# Patient Record
Sex: FEMALE | Race: WHITE
[De-identification: names, ages, dates, MRNs, and addresses within clinical notes are randomized per-mention and may not be internally consistent; named-entity substitution may affect disease eponyms.]

---

## 2018-11-12 ENCOUNTER — HOSPITAL ENCOUNTER (OUTPATIENT)
Dept: HOSPITAL 100 - PT | Age: 78
Discharge: HOME | End: 2018-11-12
Payer: MEDICARE

## 2018-11-12 DIAGNOSIS — M21.371: ICD-10-CM

## 2018-11-12 DIAGNOSIS — M54.40: Primary | ICD-10-CM

## 2018-11-12 PROCEDURE — 97162 PT EVAL MOD COMPLEX 30 MIN: CPT

## 2019-03-19 ENCOUNTER — HOSPITAL ENCOUNTER (OUTPATIENT)
Age: 79
End: 2019-03-19
Payer: MEDICARE

## 2019-03-19 VITALS — BODY MASS INDEX: 21 KG/M2

## 2019-03-19 DIAGNOSIS — R06.02: Primary | ICD-10-CM

## 2019-03-19 LAB
ANION GAP: 3 (ref 5–15)
BNP,B-TYPE NATRIURETIC PEPTIDE: 54.6 PG/ML (ref 0–100)
BUN SERPL-MCNC: 13 MG/DL (ref 7–18)
BUN/CREAT RATIO: 21 RATIO (ref 10–20)
CALCIUM SERPL-MCNC: 8.9 MG/DL (ref 8.5–10.1)
CARBON DIOXIDE: 32 MMOL/L (ref 21–32)
CHLORIDE: 96 MMOL/L (ref 98–107)
EST GLOM FILT RATE - AFR AMER: 120 ML/MIN (ref 60–?)
GLUCOSE: 103 MG/DL (ref 74–106)
POTASSIUM: 3.9 MMOL/L (ref 3.5–5.1)

## 2019-03-19 PROCEDURE — 36415 COLL VENOUS BLD VENIPUNCTURE: CPT

## 2019-03-19 PROCEDURE — 71046 X-RAY EXAM CHEST 2 VIEWS: CPT

## 2019-03-19 PROCEDURE — 83880 ASSAY OF NATRIURETIC PEPTIDE: CPT

## 2019-03-19 PROCEDURE — 80048 BASIC METABOLIC PNL TOTAL CA: CPT

## 2019-04-03 ENCOUNTER — HOSPITAL ENCOUNTER (OUTPATIENT)
Age: 79
End: 2019-04-03
Payer: MEDICARE

## 2019-04-03 VITALS — BODY MASS INDEX: 21 KG/M2

## 2019-04-03 DIAGNOSIS — I34.0: Primary | ICD-10-CM

## 2019-04-03 PROCEDURE — 93306 TTE W/DOPPLER COMPLETE: CPT

## 2019-05-30 ENCOUNTER — HOSPITAL ENCOUNTER (OUTPATIENT)
Age: 79
End: 2019-05-30
Payer: MEDICARE

## 2019-05-30 VITALS — BODY MASS INDEX: 22.3 KG/M2

## 2019-05-30 DIAGNOSIS — R06.02: Primary | ICD-10-CM

## 2019-05-30 PROCEDURE — 94729 DIFFUSING CAPACITY: CPT

## 2019-05-30 PROCEDURE — 94060 EVALUATION OF WHEEZING: CPT

## 2019-05-30 PROCEDURE — 94726 PLETHYSMOGRAPHY LUNG VOLUMES: CPT

## 2019-06-11 ENCOUNTER — HOSPITAL ENCOUNTER (OUTPATIENT)
Age: 79
End: 2019-06-11
Payer: MEDICARE

## 2019-06-11 VITALS — HEART RATE: 68 BPM | OXYGEN SATURATION: 86 %

## 2019-06-11 VITALS — BODY MASS INDEX: 22.3 KG/M2

## 2019-06-11 DIAGNOSIS — R06.02: Primary | ICD-10-CM

## 2019-06-11 PROCEDURE — 94618 PULMONARY STRESS TESTING: CPT

## 2019-11-06 ENCOUNTER — HOSPITAL ENCOUNTER (OUTPATIENT)
Age: 79
End: 2019-11-06
Payer: MEDICARE

## 2019-11-06 VITALS — BODY MASS INDEX: 23 KG/M2

## 2019-11-06 DIAGNOSIS — R06.00: Primary | ICD-10-CM

## 2019-11-06 DIAGNOSIS — R53.83: ICD-10-CM

## 2019-11-06 PROCEDURE — 93226 XTRNL ECG REC<48 HR SCAN A/R: CPT

## 2019-11-06 PROCEDURE — 93225 XTRNL ECG REC<48 HRS REC: CPT

## 2019-11-19 ENCOUNTER — HOSPITAL ENCOUNTER (OUTPATIENT)
Age: 79
End: 2019-11-19
Payer: MEDICARE

## 2019-11-19 VITALS — BODY MASS INDEX: 23 KG/M2 | BODY MASS INDEX: 22.5 KG/M2

## 2019-11-19 DIAGNOSIS — M41.9: ICD-10-CM

## 2019-11-19 DIAGNOSIS — R09.02: Primary | ICD-10-CM

## 2019-11-19 PROCEDURE — 94729 DIFFUSING CAPACITY: CPT

## 2019-11-19 PROCEDURE — 94060 EVALUATION OF WHEEZING: CPT

## 2019-11-19 PROCEDURE — 94726 PLETHYSMOGRAPHY LUNG VOLUMES: CPT

## 2020-02-05 ENCOUNTER — OFFICE VISIT (OUTPATIENT)
Dept: FAMILY MEDICINE CLINIC | Age: 80
End: 2020-02-05
Payer: MEDICARE

## 2020-02-05 VITALS
DIASTOLIC BLOOD PRESSURE: 60 MMHG | BODY MASS INDEX: 22.89 KG/M2 | SYSTOLIC BLOOD PRESSURE: 98 MMHG | WEIGHT: 124.4 LBS | HEIGHT: 62 IN | OXYGEN SATURATION: 92 % | RESPIRATION RATE: 16 BRPM | HEART RATE: 59 BPM

## 2020-02-05 PROBLEM — Z76.89 ENCOUNTER TO ESTABLISH CARE: Status: ACTIVE | Noted: 2020-02-05

## 2020-02-05 PROBLEM — L60.0 INGROWN TOENAIL OF LEFT FOOT: Status: ACTIVE | Noted: 2020-02-05

## 2020-02-05 PROBLEM — Z87.09 H/O CHRONIC RESPIRATORY FAILURE: Status: ACTIVE | Noted: 2020-01-21

## 2020-02-05 PROBLEM — M25.511 ACUTE PAIN OF RIGHT SHOULDER: Status: ACTIVE | Noted: 2020-02-05

## 2020-02-05 PROBLEM — F41.9 ANXIETY: Status: ACTIVE | Noted: 2020-02-05

## 2020-02-05 PROBLEM — Z79.899 HIGH RISK MEDICATION USE: Status: ACTIVE | Noted: 2020-01-21

## 2020-02-05 PROBLEM — G62.9 PERIPHERAL NEUROPATHY: Status: ACTIVE | Noted: 2020-01-21

## 2020-02-05 PROCEDURE — G8427 DOCREV CUR MEDS BY ELIG CLIN: HCPCS | Performed by: NURSE PRACTITIONER

## 2020-02-05 PROCEDURE — G8484 FLU IMMUNIZE NO ADMIN: HCPCS | Performed by: NURSE PRACTITIONER

## 2020-02-05 PROCEDURE — 99203 OFFICE O/P NEW LOW 30 MIN: CPT | Performed by: NURSE PRACTITIONER

## 2020-02-05 PROCEDURE — G8420 CALC BMI NORM PARAMETERS: HCPCS | Performed by: NURSE PRACTITIONER

## 2020-02-05 PROCEDURE — 1123F ACP DISCUSS/DSCN MKR DOCD: CPT | Performed by: NURSE PRACTITIONER

## 2020-02-05 PROCEDURE — 1036F TOBACCO NON-USER: CPT | Performed by: NURSE PRACTITIONER

## 2020-02-05 PROCEDURE — 4040F PNEUMOC VAC/ADMIN/RCVD: CPT | Performed by: NURSE PRACTITIONER

## 2020-02-05 PROCEDURE — G8400 PT W/DXA NO RESULTS DOC: HCPCS | Performed by: NURSE PRACTITIONER

## 2020-02-05 PROCEDURE — 1090F PRES/ABSN URINE INCON ASSESS: CPT | Performed by: NURSE PRACTITIONER

## 2020-02-05 RX ORDER — LORATADINE 10 MG/1
10 TABLET ORAL DAILY PRN
COMMUNITY
End: 2020-06-19 | Stop reason: ALTCHOICE

## 2020-02-05 RX ORDER — B-COMPLEX WITH VITAMIN C
1 TABLET ORAL 3 TIMES DAILY
COMMUNITY

## 2020-02-05 RX ORDER — GABAPENTIN 100 MG/1
100 CAPSULE ORAL DAILY PRN
COMMUNITY

## 2020-02-05 RX ORDER — VITAMIN E (DL,TOCOPHERYL ACET) 180 MG
500 CAPSULE ORAL DAILY
COMMUNITY

## 2020-02-05 RX ORDER — FUROSEMIDE 40 MG/1
20 TABLET ORAL DAILY
COMMUNITY
Start: 2020-01-21 | End: 2020-08-06 | Stop reason: DRUGHIGH

## 2020-02-05 RX ORDER — LORAZEPAM 0.5 MG/1
0.5 TABLET ORAL 2 TIMES DAILY PRN
Qty: 30 TABLET | Refills: 2 | Status: SHIPPED | OUTPATIENT
Start: 2020-02-05 | End: 2020-06-01 | Stop reason: SDUPTHER

## 2020-02-05 RX ORDER — LANSOPRAZOLE 15 MG/1
CAPSULE, DELAYED RELEASE ORAL
COMMUNITY
Start: 2017-09-21

## 2020-02-05 RX ORDER — LISINOPRIL 40 MG/1
40 TABLET ORAL DAILY
COMMUNITY
Start: 2019-12-04

## 2020-02-05 RX ORDER — LORAZEPAM 0.5 MG/1
0.5 TABLET ORAL DAILY PRN
COMMUNITY
End: 2020-02-05 | Stop reason: SDUPTHER

## 2020-02-05 RX ORDER — SOTALOL HYDROCHLORIDE 80 MG/1
80 TABLET ORAL 2 TIMES DAILY
COMMUNITY
Start: 2020-01-21 | End: 2021-04-01 | Stop reason: SDUPTHER

## 2020-02-05 RX ORDER — TIZANIDINE 2 MG/1
2 TABLET ORAL PRN
COMMUNITY
End: 2020-12-18 | Stop reason: ALTCHOICE

## 2020-02-05 RX ORDER — NIFEDIPINE 30 MG/1
30 TABLET, EXTENDED RELEASE ORAL DAILY
COMMUNITY
Start: 2020-01-21 | End: 2022-01-03 | Stop reason: SINTOL

## 2020-02-05 RX ORDER — DOXAZOSIN MESYLATE 4 MG/1
4 TABLET ORAL DAILY
COMMUNITY
Start: 2019-12-28 | End: 2020-12-02

## 2020-02-05 RX ORDER — ALBUTEROL SULFATE 90 UG/1
1 AEROSOL, METERED RESPIRATORY (INHALATION) EVERY 6 HOURS PRN
COMMUNITY
End: 2020-07-31 | Stop reason: SDUPTHER

## 2020-02-05 RX ORDER — ACETAMINOPHEN 500 MG
TABLET ORAL
COMMUNITY
Start: 2011-05-11

## 2020-02-05 RX ORDER — PRAMIPEXOLE DIHYDROCHLORIDE 0.5 MG/1
1 TABLET ORAL 3 TIMES DAILY
COMMUNITY
End: 2020-07-22 | Stop reason: SDUPTHER

## 2020-02-05 ASSESSMENT — PATIENT HEALTH QUESTIONNAIRE - PHQ9
1. LITTLE INTEREST OR PLEASURE IN DOING THINGS: 0
SUM OF ALL RESPONSES TO PHQ QUESTIONS 1-9: 0
SUM OF ALL RESPONSES TO PHQ QUESTIONS 1-9: 0
SUM OF ALL RESPONSES TO PHQ9 QUESTIONS 1 & 2: 0
2. FEELING DOWN, DEPRESSED OR HOPELESS: 0

## 2020-02-05 ASSESSMENT — ENCOUNTER SYMPTOMS
GASTROINTESTINAL NEGATIVE: 1
RESPIRATORY NEGATIVE: 1
BACK PAIN: 1

## 2020-02-05 NOTE — PROGRESS NOTES
Subjective:      Chief Complaint   Patient presents with    New Patient     67year old female in office to establish care    Toe Pain     Pt states left big toe was inflamed the other day       HPI:  Marzena Medina is a 67 y.o. female who presents today to establish care as a new patient. She recently moved from [place] to be closer to family. She currently follows with Dr. Sofia Bui, Cardiology. She was last seen on 01/21/2020, blood work was ordered at that time. She also follows with Dr. Jayme Portillo, Pulmonologist for restrictive lung disease. She is on chronic home O2, 2 liters nasal cannula. Her next appointment with Pulmonology is in March. She has severe scoliosis and LE neuropathy which has increased her risk of falls. She currently lives alone and uses a 4 wheeled walker to assist with ambulation at all times. When she lived in [place] she was seeing a Neurologist who recommended a brain MRI to further evaluate worsening neuropathy and falls. She declined to have further testing and is not interested in seeing another neurologist at this time. She is taking Gabapentin as needed when neuropathy pain is severe. She does not take it daily because it makes her feel drowsy and she doesn't want to increase her chances of falling. Her last fall was in September 2019. She does not drive and relies on family members for transportation. She has generalized anxiety and has taken Ativan for years. Per [place] it has been prescribed by her previous PCP and was last filled 11/19/2019. She does not take it everyday only when her anxiety is severe. She would like to have prescription refilled today    Has right shoulder pain with decreased ROM. She feels that the pain is a result of her last fall in September. She does feel that her right arm is weaker than her left. She has not had imaging. She is hesitant for physical therapy because leaving the home can be cumbersome.      She has a 2 day history of pain around the nail bed of her left great toe. She notes increased redness with mild swelling. She denies drainage or any lesions. Past Medical History:   Diagnosis Date    H/O chronic respiratory failure 1/21/2020    High risk medication use 1/21/2020    HTN (hypertension)     Mitral valve prolapse     Paroxysmal atrial fibrillation (HCC)     Peripheral neuropathy 1/21/2020    Restrictive lung disease     chronic home O2 - 2 liters [place]    Scoliosis         Social History     Tobacco Use    Smoking status: Never Smoker    Smokeless tobacco: Never Used   Substance Use Topics    Alcohol use: Yes     Comment: occaisonal wine        Review of Systems   Constitutional: Negative. HENT: Negative. Respiratory: Negative. Cardiovascular: Negative. Gastrointestinal: Negative. Endocrine: Negative. Musculoskeletal: Positive for arthralgias, back pain, gait problem and myalgias. Skin: Negative. Neurological: Positive for weakness. Negative for dizziness, tremors, seizures, syncope, facial asymmetry, speech difficulty, light-headedness, numbness and headaches. Psychiatric/Behavioral: Negative for decreased concentration, dysphoric mood, sleep disturbance and suicidal ideas. The patient is nervous/anxious. The patient is not hyperactive. Objective:      BP 98/60 (Site: Right Upper Arm, Position: Sitting, Cuff Size: Medium Adult)   Pulse 59   Resp 16   Ht 5' 2\" (1.575 m)   Wt 124 lb 6.4 oz (56.4 kg)   SpO2 92%   BMI 22.75 kg/m²      Physical Exam  Vitals signs reviewed. HENT:      Mouth/Throat:      Mouth: Mucous membranes are moist.      Pharynx: Oropharynx is clear. Eyes:      Extraocular Movements: Extraocular movements intact. Conjunctiva/sclera: Conjunctivae normal.      Pupils: Pupils are equal, round, and reactive to light. Neck:      Musculoskeletal: Normal range of motion and neck supple. Thyroid: No thyroid mass, thyromegaly or thyroid tenderness. Vascular: No carotid bruit. Cardiovascular:      Rate and Rhythm: Regular rhythm. Bradycardia present. Pulses: Normal pulses. Heart sounds: Normal heart sounds. Pulmonary:      Effort: Pulmonary effort is normal.      Breath sounds: Decreased breath sounds (throughout) present. No wheezing, rhonchi or rales. Abdominal:      General: Bowel sounds are normal.      Palpations: Abdomen is soft. There is no mass. Hernia: No hernia is present. Feet:      Right foot:      Skin integrity: Skin integrity normal.      Left foot:      Skin integrity: Skin integrity normal.      Toenail Condition: Left toenails are ingrown. Skin:     General: Skin is warm and dry. Capillary Refill: Capillary refill takes less than 2 seconds. Neurological:      General: No focal deficit present. Mental Status: She is alert. Cranial Nerves: Cranial nerves are intact. Sensory: Sensory deficit (bilateral LE with decreased sensation) present. Motor: Weakness (RUE and bilateral LE) present. Gait: Gait abnormal (she is using a walker). Psychiatric:         Attention and Perception: Attention normal.         Mood and Affect: Mood and affect normal.         Behavior: Behavior normal. Behavior is cooperative. Cognition and Memory: Cognition and memory normal.            Assessment / Plan:      1. Encounter to establish care  Welcome to my practice, I look forward to helping you meet your health goals. Please let me know how I can help you. 2. Ingrown toenail of left foot  Soak the affected foot in warm, soapy water for 10 to 20 minutes three times per day for one to two weeks, pushing the lateral nail fold away from the nail plate. Alternatively, a solution of water mixed with 1 to 2 teaspoons of Epsom salts can be used   - triamcinolone (KENALOG) 0.1 % ointment; Apply topically 2 times daily for 14 days  Dispense: 1 Tube; Refill: 1    3.  Anxiety  OARRS reviewed - no concerning behaviors identified  - LORazepam (ATIVAN) 0.5 MG tablet; Take 1 tablet by mouth 2 times daily as needed for Anxiety for up to 45 days. Dispense: 30 tablet; Refill: 2    4. At high risk for falls  [facility]  - [facility]    5. Acute pain of right shoulder  - Emmett [address], APRN - CNP    On the basis of positive falls risk screening, assessment and plan is as follows: home safety tips provided, referral to physical therapy provided for strength and balance training.

## 2020-02-05 NOTE — PATIENT INSTRUCTIONS
Patient Education        Ingrown Toenail: Care Instructions  Your Care Instructions    An ingrown toenail often occurs because a nail is not trimmed correctly or because shoes are too tight. An ingrown nail can cause an infection. If your toe is infected, your doctor may prescribe antibiotics. Most ingrown toenails can be treated at home. You should trim toenails straight across, so the ends of the nail grow over the skin and not into it. Good nail care can prevent ingrown toenails. Follow-up care is a key part of your treatment and safety. Be sure to make and go to all appointments, and call your doctor if you are having problems. It's also a good idea to know your test results and keep a list of the medicines you take. How can you care for yourself at home? · Trim the nails straight across. Leave the corners a little longer so they do not cut into the skin. To do this when you have an ingrown nail:  ? Soak your foot in warm water for about 15 minutes to soften the nail. ? Wedge a small piece of wet cotton under the corner of the nail to cushion the nail and lift it slightly. This keeps it from cutting the skin. ? Repeat daily until the nail has grown out and can be trimmed. · Do not use manicure scissors to dig under the ingrown nail. You might stab your toe, which could get infected. · Do not trim your toenails too short. · Check with your doctor before trimming your own toenails if you have been diagnosed with diabetes or peripheral arterial disease. These conditions increase the risk of an infection, because you may have decreased sensation in your toes and cut yourself without knowing it. · Wear roomy, comfortable shoes. · If your doctor prescribed antibiotics, take them as directed. Do not stop taking them just because you feel better. You need to take the full course of antibiotics. When should you call for help?   Call your doctor now or seek immediate medical care if:    · You have signs of infection, such as:  ? Increased pain, swelling, warmth, or redness. ? Red streaks leading from the toe. ? Pus draining from the toe. ? A fever.    Watch closely for changes in your health, and be sure to contact your doctor if:    · You do not get better as expected. Where can you learn more? Go to [email or web address]://[email or web address] org and sign in to your [employer] account. Enter R135 in the [employer] box to learn more about \"Ingrown Toenail: Care Instructions. \"     If you do not have an account, please click on the \"Sign Up Now\" link. Current as of: April 1, 2019  Content Version: 12.3  © 7673-6697 Healthwise, Incorporated. Care instructions adapted under license by [facility] ([facility]). If you have questions about a medical condition or this instruction, always ask your healthcare professional. [address] any warranty or liability for your use of this information.

## 2020-05-15 ENCOUNTER — TELEPHONE (OUTPATIENT)
Dept: FAMILY MEDICINE CLINIC | Age: 80
End: 2020-05-15

## 2020-05-15 NOTE — TELEPHONE ENCOUNTER
I sent in a refill for Ativan on 02/05 when I saw her in office. She should check with the pharmacy to see if they have the prescription. If not I will resend.

## 2020-06-01 RX ORDER — LORAZEPAM 0.5 MG/1
0.5 TABLET ORAL 2 TIMES DAILY PRN
Qty: 30 TABLET | Refills: 2 | Status: SHIPPED | OUTPATIENT
Start: 2020-06-01 | End: 2020-07-16

## 2020-06-19 ENCOUNTER — OFFICE VISIT (OUTPATIENT)
Dept: FAMILY MEDICINE CLINIC | Age: 80
End: 2020-06-19
Payer: MEDICARE

## 2020-06-19 VITALS
SYSTOLIC BLOOD PRESSURE: 110 MMHG | TEMPERATURE: 97.3 F | HEART RATE: 65 BPM | RESPIRATION RATE: 14 BRPM | OXYGEN SATURATION: 93 % | WEIGHT: 125.2 LBS | DIASTOLIC BLOOD PRESSURE: 60 MMHG | BODY MASS INDEX: 22.9 KG/M2

## 2020-06-19 PROCEDURE — 69209 REMOVE IMPACTED EAR WAX UNI: CPT | Performed by: NURSE PRACTITIONER

## 2020-06-19 PROCEDURE — 4040F PNEUMOC VAC/ADMIN/RCVD: CPT | Performed by: NURSE PRACTITIONER

## 2020-06-19 PROCEDURE — 93000 ELECTROCARDIOGRAM COMPLETE: CPT | Performed by: NURSE PRACTITIONER

## 2020-06-19 PROCEDURE — G8427 DOCREV CUR MEDS BY ELIG CLIN: HCPCS | Performed by: NURSE PRACTITIONER

## 2020-06-19 PROCEDURE — G8400 PT W/DXA NO RESULTS DOC: HCPCS | Performed by: NURSE PRACTITIONER

## 2020-06-19 PROCEDURE — G8420 CALC BMI NORM PARAMETERS: HCPCS | Performed by: NURSE PRACTITIONER

## 2020-06-19 PROCEDURE — 1090F PRES/ABSN URINE INCON ASSESS: CPT | Performed by: NURSE PRACTITIONER

## 2020-06-19 PROCEDURE — 99214 OFFICE O/P EST MOD 30 MIN: CPT | Performed by: NURSE PRACTITIONER

## 2020-06-19 PROCEDURE — 1036F TOBACCO NON-USER: CPT | Performed by: NURSE PRACTITIONER

## 2020-06-19 PROCEDURE — 1123F ACP DISCUSS/DSCN MKR DOCD: CPT | Performed by: NURSE PRACTITIONER

## 2020-06-19 RX ORDER — FLUTICASONE PROPIONATE 50 MCG
1 SPRAY, SUSPENSION (ML) NASAL DAILY
Qty: 2 BOTTLE | Refills: 5 | Status: SHIPPED | OUTPATIENT
Start: 2020-06-19 | End: 2022-01-03

## 2020-06-19 ASSESSMENT — PATIENT HEALTH QUESTIONNAIRE - PHQ9
1. LITTLE INTEREST OR PLEASURE IN DOING THINGS: 1
SUM OF ALL RESPONSES TO PHQ9 QUESTIONS 1 & 2: 1
2. FEELING DOWN, DEPRESSED OR HOPELESS: 0
SUM OF ALL RESPONSES TO PHQ QUESTIONS 1-9: 1
SUM OF ALL RESPONSES TO PHQ QUESTIONS 1-9: 1

## 2020-06-19 ASSESSMENT — ENCOUNTER SYMPTOMS
RESPIRATORY NEGATIVE: 1
BACK PAIN: 1
GASTROINTESTINAL NEGATIVE: 1

## 2020-06-19 NOTE — PROGRESS NOTES
flonaSubjective:      Chief Complaint   Patient presents with    Other     patient is here to discuss going to physical therapy        HPI:  Teddy Mckay is a 79 y.o. female who presents today for the following concerns:     She has severe scoliosis and LE neuropathy which has increased her risk of falls. She currently lives alone and uses a 4 wheeled walker to assist with ambulation at all times. She fell the other day - she felt like her legs were weak and gave out. She denies LOC or injury. When she lived in [place] she was seeing a Neurologist who recommended a brain MRI to further evaluate worsening neuropathy and falls. She declined to have further testing at that time. She would like a referral to local Neurologist.   She is taking Gabapentin as needed when neuropathy pain is severe. She does not take it daily because it makes her feel drowsy and she doesn't want to increase her chances of falling. She does not drive and relies on family members for transportation. Has right shoulder pain with decreased ROM. She feels that the pain is a result of her last fall in September. She does feel that her right arm is weaker than her left but has gradually improved since she was seen in February. She has not had imaging. Physical therapy was ordered in 02/2020. She has seasonal allergies and takes Claritin as needed. Even with Claritin she is sneezing, has pressure in her ears and nasal congestion. She denies fever, chills, headaches, sore throat, cough, or shortness of breath.      Past Medical History:   Diagnosis Date    Anxiety     H/O chronic respiratory failure 1/21/2020    High risk medication use 1/21/2020    HTN (hypertension)     Mitral valve prolapse     Paroxysmal atrial fibrillation (HCC)     Peripheral neuropathy 1/21/2020    Restrictive lung disease     chronic home O2 - 2 liters [place]    Scoliosis         Social History     Tobacco Use    Smoking status: Never Smoker   

## 2020-06-20 LAB
ANION GAP SERPL CALCULATED.3IONS-SCNC: 15 MMOL/L (ref 3–16)
BUN BLDV-MCNC: 16 MG/DL (ref 7–20)
CALCIUM SERPL-MCNC: 9.2 MG/DL (ref 8.3–10.6)
CHLORIDE BLD-SCNC: 85 MMOL/L (ref 99–110)
CO2: 32 MMOL/L (ref 21–32)
CREAT SERPL-MCNC: 0.6 MG/DL (ref 0.6–1.2)
GFR AFRICAN AMERICAN: >60
GFR NON-AFRICAN AMERICAN: >60
GLUCOSE BLD-MCNC: 93 MG/DL (ref 70–99)
POTASSIUM SERPL-SCNC: 4 MMOL/L (ref 3.5–5.1)
SODIUM BLD-SCNC: 132 MMOL/L (ref 136–145)
T4 FREE: 1.3 NG/DL (ref 0.9–1.8)
TSH SERPL DL<=0.05 MIU/L-ACNC: 1.36 UIU/ML (ref 0.27–4.2)

## 2020-06-22 PROBLEM — E87.1 HYPONATREMIA: Status: ACTIVE | Noted: 2020-06-22

## 2020-06-22 ASSESSMENT — ENCOUNTER SYMPTOMS
SINUS PAIN: 0
SINUS PRESSURE: 1
EYES NEGATIVE: 1
TROUBLE SWALLOWING: 0
SORE THROAT: 0

## 2020-06-23 DIAGNOSIS — I48.91 ATRIAL FIBRILLATION, UNSPECIFIED TYPE (HCC): ICD-10-CM

## 2020-06-23 DIAGNOSIS — E87.1 HYPONATREMIA: ICD-10-CM

## 2020-06-23 LAB
ANION GAP SERPL CALCULATED.3IONS-SCNC: 10 MMOL/L (ref 3–16)
BUN BLDV-MCNC: 14 MG/DL (ref 7–20)
CALCIUM SERPL-MCNC: 9.6 MG/DL (ref 8.3–10.6)
CHLORIDE BLD-SCNC: 84 MMOL/L (ref 99–110)
CO2: 36 MMOL/L (ref 21–32)
CREAT SERPL-MCNC: 0.5 MG/DL (ref 0.6–1.2)
GFR AFRICAN AMERICAN: >60
GFR NON-AFRICAN AMERICAN: >60
GLUCOSE BLD-MCNC: 123 MG/DL (ref 70–99)
MAGNESIUM: 2 MG/DL (ref 1.8–2.4)
POTASSIUM SERPL-SCNC: 3.9 MMOL/L (ref 3.5–5.1)
SODIUM BLD-SCNC: 130 MMOL/L (ref 136–145)

## 2020-07-01 ENCOUNTER — TELEPHONE (OUTPATIENT)
Dept: FAMILY MEDICINE CLINIC | Age: 80
End: 2020-07-01

## 2020-07-01 NOTE — TELEPHONE ENCOUNTER
Patients physical therapist (Belkis)  called and would like to continue physical therapy with patient for 10 session for strength and balance.  Please advise   Belkis [phone]

## 2020-07-06 ENCOUNTER — HOSPITAL ENCOUNTER (OUTPATIENT)
Age: 80
Setting detail: SPECIMEN
Discharge: HOME | End: 2020-07-06
Payer: MEDICARE

## 2020-07-06 LAB
CHLORIDE, UR: 60 MMOL/24 HR (ref 110–250)
Lab: 24 HRS
POTASSIUM 24 HOUR URINE: 34 MMOL/24 HR (ref 25–150)
SODIUM 24 HOUR URINE: 69 MMOL/24 HR (ref 40–220)
VOLUME, (UVOL): 1500 MLS

## 2020-07-06 PROCEDURE — 81050 URINALYSIS VOLUME MEASURE: CPT

## 2020-07-06 PROCEDURE — 84300 ASSAY OF URINE SODIUM: CPT

## 2020-07-06 PROCEDURE — 84133 ASSAY OF URINE POTASSIUM: CPT

## 2020-07-06 PROCEDURE — 82436 ASSAY OF URINE CHLORIDE: CPT

## 2020-07-22 RX ORDER — PRAMIPEXOLE DIHYDROCHLORIDE 0.5 MG/1
1 TABLET ORAL 3 TIMES DAILY
Qty: 180 TABLET | Refills: 2 | Status: SHIPPED | OUTPATIENT
Start: 2020-07-22 | End: 2020-07-27 | Stop reason: SDUPTHER

## 2020-07-24 ENCOUNTER — TELEPHONE (OUTPATIENT)
Dept: FAMILY MEDICINE CLINIC | Age: 80
End: 2020-07-24

## 2020-07-24 NOTE — TELEPHONE ENCOUNTER
[place] [facility] [place] a [place] stating that she went to patient's house for her normal visit and patient stated that she was having abdomen pain on the left side, which started Sunday. Pain has gotten better with time, but patient stated that it felt like it did when she had diverticulitis. Patient is bloated and is constipated. Hasn't had a BM for a few days. Please advise.  Thank you

## 2020-07-24 NOTE — TELEPHONE ENCOUNTER
Called patient and left a message for her to call me back as PCP wanted her to make an appointment today for further evaluation.

## 2020-07-27 RX ORDER — PRAMIPEXOLE DIHYDROCHLORIDE 0.5 MG/1
1 TABLET ORAL 3 TIMES DAILY
Qty: 180 TABLET | Refills: 2 | Status: SHIPPED | OUTPATIENT
Start: 2020-07-27 | End: 2020-07-31 | Stop reason: SDUPTHER

## 2020-07-31 ENCOUNTER — OFFICE VISIT (OUTPATIENT)
Dept: FAMILY MEDICINE CLINIC | Age: 80
End: 2020-07-31
Payer: MEDICARE

## 2020-07-31 VITALS
SYSTOLIC BLOOD PRESSURE: 120 MMHG | TEMPERATURE: 97.2 F | OXYGEN SATURATION: 91 % | BODY MASS INDEX: 22.31 KG/M2 | RESPIRATION RATE: 14 BRPM | HEART RATE: 62 BPM | WEIGHT: 122 LBS | DIASTOLIC BLOOD PRESSURE: 80 MMHG

## 2020-07-31 DIAGNOSIS — E87.1 HYPONATREMIA: ICD-10-CM

## 2020-07-31 PROBLEM — J98.4 RESTRICTIVE LUNG DISEASE: Status: ACTIVE | Noted: 2020-07-31

## 2020-07-31 LAB
A/G RATIO: 1.6 (ref 1.1–2.2)
ALBUMIN SERPL-MCNC: 4.2 G/DL (ref 3.4–5)
ALP BLD-CCNC: 76 U/L (ref 40–129)
ALT SERPL-CCNC: 13 U/L (ref 10–40)
ANION GAP SERPL CALCULATED.3IONS-SCNC: 14 MMOL/L (ref 3–16)
AST SERPL-CCNC: 23 U/L (ref 15–37)
BILIRUB SERPL-MCNC: <0.2 MG/DL (ref 0–1)
BILIRUBIN, POC: NEGATIVE
BLOOD URINE, POC: NEGATIVE
BUN BLDV-MCNC: 11 MG/DL (ref 7–20)
CALCIUM SERPL-MCNC: 9.8 MG/DL (ref 8.3–10.6)
CHLORIDE BLD-SCNC: 88 MMOL/L (ref 99–110)
CLARITY, POC: ABNORMAL
CO2: 32 MMOL/L (ref 21–32)
COLOR, POC: ABNORMAL
CREAT SERPL-MCNC: 0.6 MG/DL (ref 0.6–1.2)
GFR AFRICAN AMERICAN: >60
GFR NON-AFRICAN AMERICAN: >60
GLOBULIN: 2.6 G/DL
GLUCOSE BLD-MCNC: 88 MG/DL (ref 70–99)
GLUCOSE URINE, POC: NEGATIVE
KETONES, POC: NEGATIVE
LEUKOCYTE EST, POC: NEGATIVE
NITRITE, POC: NEGATIVE
PH, POC: 8.5
POTASSIUM SERPL-SCNC: 4.7 MMOL/L (ref 3.5–5.1)
PROTEIN, POC: ABNORMAL
SODIUM BLD-SCNC: 134 MMOL/L (ref 136–145)
SPECIFIC GRAVITY, POC: 1.02
TOTAL PROTEIN: 6.8 G/DL (ref 6.4–8.2)
UROBILINOGEN, POC: ABNORMAL

## 2020-07-31 PROCEDURE — 1123F ACP DISCUSS/DSCN MKR DOCD: CPT | Performed by: NURSE PRACTITIONER

## 2020-07-31 PROCEDURE — 1036F TOBACCO NON-USER: CPT | Performed by: NURSE PRACTITIONER

## 2020-07-31 PROCEDURE — 1090F PRES/ABSN URINE INCON ASSESS: CPT | Performed by: NURSE PRACTITIONER

## 2020-07-31 PROCEDURE — 99213 OFFICE O/P EST LOW 20 MIN: CPT | Performed by: NURSE PRACTITIONER

## 2020-07-31 PROCEDURE — G0444 DEPRESSION SCREEN ANNUAL: HCPCS | Performed by: NURSE PRACTITIONER

## 2020-07-31 PROCEDURE — G8400 PT W/DXA NO RESULTS DOC: HCPCS | Performed by: NURSE PRACTITIONER

## 2020-07-31 PROCEDURE — G8427 DOCREV CUR MEDS BY ELIG CLIN: HCPCS | Performed by: NURSE PRACTITIONER

## 2020-07-31 PROCEDURE — 81002 URINALYSIS NONAUTO W/O SCOPE: CPT | Performed by: NURSE PRACTITIONER

## 2020-07-31 PROCEDURE — G8420 CALC BMI NORM PARAMETERS: HCPCS | Performed by: NURSE PRACTITIONER

## 2020-07-31 PROCEDURE — 4040F PNEUMOC VAC/ADMIN/RCVD: CPT | Performed by: NURSE PRACTITIONER

## 2020-07-31 RX ORDER — ALBUTEROL SULFATE 90 UG/1
2 AEROSOL, METERED RESPIRATORY (INHALATION) 4 TIMES DAILY PRN
Qty: 3 INHALER | Refills: 1 | Status: SHIPPED | OUTPATIENT
Start: 2020-07-31 | End: 2020-07-31

## 2020-07-31 RX ORDER — PRAMIPEXOLE DIHYDROCHLORIDE 0.5 MG/1
1 TABLET ORAL 3 TIMES DAILY
Qty: 180 TABLET | Refills: 2 | Status: SHIPPED | OUTPATIENT
Start: 2020-07-31 | End: 2022-10-27

## 2020-07-31 RX ORDER — ALBUTEROL SULFATE 90 UG/1
1 AEROSOL, METERED RESPIRATORY (INHALATION) EVERY 4 HOURS PRN
Qty: 2 INHALER | Refills: 5 | Status: SHIPPED
Start: 2020-07-31 | End: 2020-07-31 | Stop reason: SDUPTHER

## 2020-07-31 RX ORDER — PRAMIPEXOLE DIHYDROCHLORIDE 0.5 MG/1
1 TABLET ORAL 3 TIMES DAILY
Qty: 180 TABLET | Refills: 2 | Status: SHIPPED | OUTPATIENT
Start: 2020-07-31 | End: 2020-07-31

## 2020-07-31 ASSESSMENT — PATIENT HEALTH QUESTIONNAIRE - PHQ9
3. TROUBLE FALLING OR STAYING ASLEEP: 0
2. FEELING DOWN, DEPRESSED OR HOPELESS: 1
10. IF YOU CHECKED OFF ANY PROBLEMS, HOW DIFFICULT HAVE THESE PROBLEMS MADE IT FOR YOU TO DO YOUR WORK, TAKE CARE OF THINGS AT HOME, OR GET ALONG WITH OTHER PEOPLE: 0
5. POOR APPETITE OR OVEREATING: 1
4. FEELING TIRED OR HAVING LITTLE ENERGY: 3
SUM OF ALL RESPONSES TO PHQ QUESTIONS 1-9: 8
7. TROUBLE CONCENTRATING ON THINGS, SUCH AS READING THE NEWSPAPER OR WATCHING TELEVISION: 0
SUM OF ALL RESPONSES TO PHQ QUESTIONS 1-9: 8
6. FEELING BAD ABOUT YOURSELF - OR THAT YOU ARE A FAILURE OR HAVE LET YOURSELF OR YOUR FAMILY DOWN: 0
9. THOUGHTS THAT YOU WOULD BE BETTER OFF DEAD, OR OF HURTING YOURSELF: 0
8. MOVING OR SPEAKING SO SLOWLY THAT OTHER PEOPLE COULD HAVE NOTICED. OR THE OPPOSITE, BEING SO FIGETY OR RESTLESS THAT YOU HAVE BEEN MOVING AROUND A LOT MORE THAN USUAL: 0
SUM OF ALL RESPONSES TO PHQ9 QUESTIONS 1 & 2: 4
1. LITTLE INTEREST OR PLEASURE IN DOING THINGS: 3

## 2020-07-31 NOTE — PROGRESS NOTES
Subjective:      Chief Complaint   Patient presents with    Abdominal Pain     patient was abdominal pain denies having nausea, vomiting she was taking a stool softner that caused loose bowel the discomfort has improved        HPI:  Nelsy Maria is a 79 y.o. female who presents today for the following concerns:     Constipation  Patient complains of constipation. Onset was several weeks ago. Defecation has been difficult. Co-Morbid conditions:irritable bowel syndrome. Symptoms have gradually improved. Current Health Habits: Eating fiber? no, Exercise? no, Adequate hydration? yes. Current over the counter/prescription laxative: She has been taking OTC Aminah Debra products to help soften stool  which has been not very effective. She thinks that the supplements are causing abdominal cramping. Shortness of Breath  The patient complains of increased RODRIGUEZ. Onset of symptoms was about 2 weeks ago after she decreased her Lasix to 20 mg daily. Symptom course has been constant. Symptoms are aggravated by exertion, alleviated by rest and only temporary improvement with increased use of her rescue inhaler. Past respiratory history includes restrictive lung disease. Historically she was prescribed Advair but choked on the powder every time she used the inhaler. She would like inhalers sent to [facility] - phone # [phone]. Past Medical History:   Diagnosis Date    Anxiety     H/O chronic respiratory failure 1/21/2020    High risk medication use 1/21/2020    HTN (hypertension)     Mitral valve prolapse     Paroxysmal atrial fibrillation (HCC)     Peripheral neuropathy 1/21/2020    Restrictive lung disease     chronic home O2 - 2 liters [place]    Scoliosis         Social History     Tobacco Use    Smoking status: Never Smoker    Smokeless tobacco: Never Used   Substance Use Topics    Alcohol use: Yes     Comment: occaisonal wine        Review of Systems   Constitutional: Negative. HENT: Negative for congestion, ear discharge, ear pain, sinus pressure, sinus pain, sneezing, sore throat, tinnitus and trouble swallowing. Eyes: Negative. Respiratory: Positive for shortness of breath (increased RODRIGUEZ). Negative for cough, chest tightness and wheezing. Cardiovascular: Positive for leg swelling. Negative for chest pain and palpitations. Gastrointestinal: Positive for abdominal pain (cramping which is improving). Negative for diarrhea, nausea and vomiting. Endocrine: Negative. Musculoskeletal: Positive for arthralgias, back pain, gait problem and myalgias. Skin: Negative. Neurological: Positive for weakness. Negative for dizziness, tremors, seizures, syncope, facial asymmetry, speech difficulty, light-headedness, numbness and headaches. Psychiatric/Behavioral: Negative for decreased concentration, dysphoric mood, sleep disturbance and suicidal ideas. The patient is nervous/anxious (well controlled). The patient is not hyperactive. Objective:      /80 (Site: Left Upper Arm, Position: Sitting, Cuff Size: Medium Adult)   Pulse 62   Temp 97.2 °F (36.2 °C) (Temporal)   Resp 14   Wt 122 lb (55.3 kg)   SpO2 91%   BMI 22.31 kg/m²      Physical Exam  Vitals signs reviewed. Constitutional:       General: She is not in acute distress. Appearance: Normal appearance. She is not ill-appearing. HENT:      Mouth/Throat:      Mouth: Mucous membranes are moist.      Pharynx: Oropharynx is clear. Eyes:      Extraocular Movements: Extraocular movements intact. Conjunctiva/sclera: Conjunctivae normal.      Pupils: Pupils are equal, round, and reactive to light. Neck:      Musculoskeletal: Normal range of motion and neck supple. Thyroid: No thyroid mass, thyromegaly or thyroid tenderness. Vascular: No carotid bruit. Cardiovascular:      Rate and Rhythm: Normal rate and regular rhythm. Pulses: Normal pulses.       Heart sounds: Normal heart sounds. Pulmonary:      Effort: Pulmonary effort is normal.      Breath sounds: Decreased breath sounds (throughout) present. No wheezing, rhonchi or rales. Abdominal:      General: Bowel sounds are normal. There is no distension. Palpations: Abdomen is soft. There is no mass. Tenderness: There is no abdominal tenderness. Hernia: No hernia is present. Musculoskeletal:      Right lower leg: No edema. Left lower leg: No edema. Skin:     General: Skin is warm and dry. Capillary Refill: Capillary refill takes less than 2 seconds. Neurological:      General: No focal deficit present. Mental Status: She is alert. Cranial Nerves: Cranial nerves are intact. Sensory: Sensory deficit (bilateral LE with decreased sensation) present. Motor: Weakness (RUE and bilateral LE) present. Gait: Gait abnormal (she is using a walker). Psychiatric:         Attention and Perception: Attention normal.         Mood and Affect: Mood and affect normal.         Behavior: Behavior normal. Behavior is cooperative. Cognition and Memory: Cognition and memory normal.            Assessment / Plan:      1. Generalized abdominal pain  Increase your daily fiber intake; eating foods high in fiber like fresh fruits with the peel & fresh vegetables, and whole grain foods. Limit cheese as it can cause constipation. Make sure that you are drinking at least 8 glasses of water every day. May take a daily fiber supplement like Metamucil or Benefiber.  - POCT Urinalysis no Micro    2. Restrictive lung disease  Message left for [facility] - phone # [phone]. Will need fax number to fax prescriptions for inhalers.             JEREMY Hendricks - CNP

## 2020-08-03 ASSESSMENT — ENCOUNTER SYMPTOMS
COUGH: 0
DIARRHEA: 0
VOMITING: 0
TROUBLE SWALLOWING: 0
EYES NEGATIVE: 1
CHEST TIGHTNESS: 0
SHORTNESS OF BREATH: 1
SINUS PRESSURE: 0
SORE THROAT: 0
SINUS PAIN: 0
WHEEZING: 0
ABDOMINAL PAIN: 1
NAUSEA: 0
BACK PAIN: 1

## 2020-08-04 RX ORDER — ALBUTEROL SULFATE 90 UG/1
2 AEROSOL, METERED RESPIRATORY (INHALATION) 4 TIMES DAILY PRN
Qty: 3 INHALER | Refills: 5 | Status: SHIPPED | OUTPATIENT
Start: 2020-08-04 | End: 2020-12-18 | Stop reason: SDUPTHER

## 2020-08-06 ENCOUNTER — TELEPHONE (OUTPATIENT)
Dept: FAMILY MEDICINE CLINIC | Age: 80
End: 2020-08-06

## 2020-08-07 RX ORDER — FUROSEMIDE 20 MG/1
20 TABLET ORAL DAILY
Qty: 30 TABLET | Refills: 5 | Status: SHIPPED | OUTPATIENT
Start: 2020-08-07 | End: 2021-03-05 | Stop reason: DRUGHIGH

## 2020-08-07 RX ORDER — SPIRONOLACTONE 25 MG/1
25 TABLET ORAL DAILY
Qty: 30 TABLET | Refills: 5 | Status: SHIPPED | OUTPATIENT
Start: 2020-08-07 | End: 2020-09-25 | Stop reason: ALTCHOICE

## 2020-08-12 ENCOUNTER — TELEPHONE (OUTPATIENT)
Dept: FAMILY MEDICINE CLINIC | Age: 80
End: 2020-08-12

## 2020-08-12 NOTE — TELEPHONE ENCOUNTER
Patient called and stated she has called and left a message concerning her medication. I tried to call patient back and left a message. Wu Andrews has made multiple attempts to contact the pharmacy to get a fax number and I called twice and we are unable to get someone to answer the phone.  Patient is request to have her prescriptions faxed to [facility] we have not been able to reach anyone at the pharmacy if she has a different number please get the number from the patient

## 2020-08-19 ENCOUNTER — TELEPHONE (OUTPATIENT)
Dept: FAMILY MEDICINE CLINIC | Age: 80
End: 2020-08-19

## 2020-08-19 NOTE — TELEPHONE ENCOUNTER
Called and spoke to patient advised that her PCP is out of the office until Monday when she returns I will send her a message regarding her handicap placard card

## 2020-08-21 ENCOUNTER — NURSE ONLY (OUTPATIENT)
Dept: FAMILY MEDICINE CLINIC | Age: 80
End: 2020-08-21
Payer: MEDICARE

## 2020-08-21 PROCEDURE — 36415 COLL VENOUS BLD VENIPUNCTURE: CPT | Performed by: NURSE PRACTITIONER

## 2020-08-22 LAB
A/G RATIO: 1.7 (ref 1.1–2.2)
ALBUMIN SERPL-MCNC: 4.4 G/DL (ref 3.4–5)
ALP BLD-CCNC: 88 U/L (ref 40–129)
ALT SERPL-CCNC: 14 U/L (ref 10–40)
ANION GAP SERPL CALCULATED.3IONS-SCNC: 11 MMOL/L (ref 3–16)
AST SERPL-CCNC: 21 U/L (ref 15–37)
BILIRUB SERPL-MCNC: <0.2 MG/DL (ref 0–1)
BUN BLDV-MCNC: 15 MG/DL (ref 7–20)
CALCIUM SERPL-MCNC: 9.8 MG/DL (ref 8.3–10.6)
CHLORIDE BLD-SCNC: 91 MMOL/L (ref 99–110)
CO2: 32 MMOL/L (ref 21–32)
CREAT SERPL-MCNC: 0.6 MG/DL (ref 0.6–1.2)
GFR AFRICAN AMERICAN: >60
GFR NON-AFRICAN AMERICAN: >60
GLOBULIN: 2.6 G/DL
GLUCOSE BLD-MCNC: 90 MG/DL (ref 70–99)
POTASSIUM SERPL-SCNC: 5.5 MMOL/L (ref 3.5–5.1)
SODIUM BLD-SCNC: 134 MMOL/L (ref 136–145)
TOTAL PROTEIN: 7 G/DL (ref 6.4–8.2)

## 2020-08-24 NOTE — TELEPHONE ENCOUNTER
This nurse spoke with client and shared the  availability of letter. Request for letter to be mailed due to transportation issues with letter mailed as requested.

## 2020-08-26 ENCOUNTER — TELEPHONE (OUTPATIENT)
Dept: FAMILY MEDICINE CLINIC | Age: 80
End: 2020-08-26

## 2020-08-27 NOTE — TELEPHONE ENCOUNTER
Called patient-lengthy instructions give to patient-she verbalizes understanding-she is coming tomorrow to get the letter and will return next Friday for to get labs repeated

## 2020-08-27 NOTE — TELEPHONE ENCOUNTER
Her sodium is stable. However her potassium is now increased which is due to spironolactone. I would like her to d/c this medication. I recommend that she continue Lasix 20 mg daily at this time and then take an extra 20 mg once a day as needed for worsening edema. I would like to repeat her bloodwork one more time 1 week after stopping Spironolactone. When does follow up with Cardiology? Can we please fax all recent labs to Yesenia Muñoz at [facility] in [place] please?

## 2020-08-27 NOTE — TELEPHONE ENCOUNTER
Called patient-instructions given regarding the letter-she verbalizes understanding-she is wondering how her lab results turned out-please advise

## 2020-09-03 DIAGNOSIS — E87.5 HYPERKALEMIA: ICD-10-CM

## 2020-09-03 LAB
A/G RATIO: 1.8 (ref 1.1–2.2)
ALBUMIN SERPL-MCNC: 4.2 G/DL (ref 3.4–5)
ALP BLD-CCNC: 92 U/L (ref 40–129)
ALT SERPL-CCNC: 12 U/L (ref 10–40)
ANION GAP SERPL CALCULATED.3IONS-SCNC: 9 MMOL/L (ref 3–16)
AST SERPL-CCNC: 23 U/L (ref 15–37)
BILIRUB SERPL-MCNC: 0.3 MG/DL (ref 0–1)
BUN BLDV-MCNC: 15 MG/DL (ref 7–20)
CALCIUM SERPL-MCNC: 9.5 MG/DL (ref 8.3–10.6)
CHLORIDE BLD-SCNC: 88 MMOL/L (ref 99–110)
CO2: 32 MMOL/L (ref 21–32)
CREAT SERPL-MCNC: 0.5 MG/DL (ref 0.6–1.2)
GFR AFRICAN AMERICAN: >60
GFR NON-AFRICAN AMERICAN: >60
GLOBULIN: 2.4 G/DL
GLUCOSE BLD-MCNC: 98 MG/DL (ref 70–99)
POTASSIUM SERPL-SCNC: 5.4 MMOL/L (ref 3.5–5.1)
SODIUM BLD-SCNC: 129 MMOL/L (ref 136–145)
TOTAL PROTEIN: 6.6 G/DL (ref 6.4–8.2)

## 2020-09-08 ENCOUNTER — TELEPHONE (OUTPATIENT)
Dept: FAMILY MEDICINE CLINIC | Age: 80
End: 2020-09-08

## 2020-09-08 NOTE — TELEPHONE ENCOUNTER
Patient called and is requesting a roll later (walker) her walker is broke on of the wheels have fallen off. She would [facility] the [facility]  Fax no.  [phone]

## 2020-09-19 ENCOUNTER — HOSPITAL ENCOUNTER (OUTPATIENT)
Dept: CT IMAGING | Age: 80
Discharge: HOME | End: 2020-09-19
Payer: MEDICARE

## 2020-09-19 PROCEDURE — 71250 CT THORAX DX C-: CPT

## 2020-09-25 ENCOUNTER — OFFICE VISIT (OUTPATIENT)
Dept: FAMILY MEDICINE CLINIC | Age: 80
End: 2020-09-25
Payer: MEDICARE

## 2020-09-25 VITALS
RESPIRATION RATE: 14 BRPM | TEMPERATURE: 97 F | DIASTOLIC BLOOD PRESSURE: 70 MMHG | BODY MASS INDEX: 22.72 KG/M2 | SYSTOLIC BLOOD PRESSURE: 110 MMHG | OXYGEN SATURATION: 98 % | WEIGHT: 124.2 LBS | HEART RATE: 66 BPM

## 2020-09-25 PROBLEM — R39.11 URINARY HESITANCY: Status: ACTIVE | Noted: 2020-09-25

## 2020-09-25 PROBLEM — N13.30 PYELOCALIECTASIS: Status: ACTIVE | Noted: 2020-09-25

## 2020-09-25 LAB
BILIRUBIN, POC: NEGATIVE
BLOOD URINE, POC: NEGATIVE
CLARITY, POC: ABNORMAL
COLOR, POC: ABNORMAL
GLUCOSE URINE, POC: NEGATIVE
KETONES, POC: NEGATIVE
LEUKOCYTE EST, POC: ABNORMAL
NITRITE, POC: NEGATIVE
PH, POC: 5
PROTEIN, POC: NEGATIVE
SPECIFIC GRAVITY, POC: 1.02
UROBILINOGEN, POC: ABNORMAL

## 2020-09-25 PROCEDURE — 1036F TOBACCO NON-USER: CPT | Performed by: NURSE PRACTITIONER

## 2020-09-25 PROCEDURE — 4040F PNEUMOC VAC/ADMIN/RCVD: CPT | Performed by: NURSE PRACTITIONER

## 2020-09-25 PROCEDURE — 81002 URINALYSIS NONAUTO W/O SCOPE: CPT | Performed by: NURSE PRACTITIONER

## 2020-09-25 PROCEDURE — 99213 OFFICE O/P EST LOW 20 MIN: CPT | Performed by: NURSE PRACTITIONER

## 2020-09-25 PROCEDURE — G8427 DOCREV CUR MEDS BY ELIG CLIN: HCPCS | Performed by: NURSE PRACTITIONER

## 2020-09-25 PROCEDURE — 1090F PRES/ABSN URINE INCON ASSESS: CPT | Performed by: NURSE PRACTITIONER

## 2020-09-25 PROCEDURE — G8400 PT W/DXA NO RESULTS DOC: HCPCS | Performed by: NURSE PRACTITIONER

## 2020-09-25 PROCEDURE — G8420 CALC BMI NORM PARAMETERS: HCPCS | Performed by: NURSE PRACTITIONER

## 2020-09-25 PROCEDURE — 1123F ACP DISCUSS/DSCN MKR DOCD: CPT | Performed by: NURSE PRACTITIONER

## 2020-09-25 ASSESSMENT — PATIENT HEALTH QUESTIONNAIRE - PHQ9
SUM OF ALL RESPONSES TO PHQ QUESTIONS 1-9: 0
SUM OF ALL RESPONSES TO PHQ QUESTIONS 1-9: 0
2. FEELING DOWN, DEPRESSED OR HOPELESS: 0
1. LITTLE INTEREST OR PLEASURE IN DOING THINGS: 0
SUM OF ALL RESPONSES TO PHQ9 QUESTIONS 1 & 2: 0

## 2020-09-25 NOTE — PROGRESS NOTES
Subjective:      Chief Complaint   Patient presents with    Other     patient is here to discuss her test results        HPI:  Paolo Smith is a 79 y.o. female who presents today for UA and to discuss the CT of her chest.  The radiologist noted underlying pyelocaliectasis and renal cysts. She has a hx of cysts on her right kidney and notes hx of enlarged right kidney with possible narrowing of right ureter. She reports urinary frequency but then experiences hesitancy when she sits down. She does not feel like she can always empty her bladder fully. Denies dysuria. She has followed with Nephrology in the past but is has been several years. Past Medical History:   Diagnosis Date    Anxiety     H/O chronic respiratory failure 1/21/2020    High risk medication use 1/21/2020    HTN (hypertension)     Mitral valve prolapse     Paroxysmal atrial fibrillation (HCC)     Peripheral neuropathy 1/21/2020    Restrictive lung disease     chronic home O2 - 2 liters [place]    Scoliosis         Social History     Tobacco Use    Smoking status: Never Smoker    Smokeless tobacco: Never Used   Substance Use Topics    Alcohol use: Yes     Comment: occaisonal wine        Review of Systems   Constitutional: Negative. Respiratory: Negative. Cardiovascular: Negative. Genitourinary: Positive for decreased urine volume, difficulty urinating, frequency and urgency. Negative for dysuria, flank pain and hematuria. Objective:      /70 (Site: Right Upper Arm, Position: Sitting, Cuff Size: Medium Adult)   Pulse 66   Temp 97 °F (36.1 °C) (Temporal)   Resp 14   Wt 124 lb 3.2 oz (56.3 kg)   SpO2 98%   BMI 22.72 kg/m²      Physical Exam  Vitals signs reviewed. Constitutional:       General: She is not in acute distress. Appearance: Normal appearance. She is not ill-appearing. HENT:      Mouth/Throat:      Mouth: Mucous membranes are moist.      Pharynx: Oropharynx is clear.    Eyes: Extraocular Movements: Extraocular movements intact. Conjunctiva/sclera: Conjunctivae normal.      Pupils: Pupils are equal, round, and reactive to light. Neck:      Musculoskeletal: Normal range of motion and neck supple. Thyroid: No thyroid mass, thyromegaly or thyroid tenderness. Vascular: No carotid bruit. Cardiovascular:      Rate and Rhythm: Normal rate and regular rhythm. Pulses: Normal pulses. Heart sounds: Normal heart sounds. Pulmonary:      Effort: Pulmonary effort is normal.      Breath sounds: Decreased breath sounds (throughout) present. No wheezing, rhonchi or rales. Abdominal:      General: Bowel sounds are normal. There is no distension. Palpations: Abdomen is soft. There is no mass. Tenderness: There is no abdominal tenderness. Hernia: No hernia is present. Musculoskeletal:      Right lower leg: Edema (mild non-pitting) present. Left lower leg: Edema (mild non-pitting) present. Skin:     General: Skin is warm and dry. Capillary Refill: Capillary refill takes less than 2 seconds. Neurological:      General: No focal deficit present. Mental Status: She is alert. Cranial Nerves: Cranial nerves are intact. Sensory: Sensory deficit (bilateral LE with decreased sensation) present. Motor: Weakness (RUE and bilateral LE) present. Gait: Gait abnormal (she is using a walker). Psychiatric:         Attention and Perception: Attention normal.         Mood and Affect: Mood and affect normal.         Behavior: Behavior normal. Behavior is cooperative. Cognition and Memory: Cognition and memory normal.            Assessment / Plan:      1. Urinary tract infection without hematuria, site unspecified  UA not concerning.   Referral placed to Nephrology due to hx and recent electrolyte imbalances.    - POCT Urinalysis no Micro         JEREMY Messer - CNP

## 2020-09-27 ASSESSMENT — ENCOUNTER SYMPTOMS: RESPIRATORY NEGATIVE: 1

## 2020-10-09 DIAGNOSIS — E87.5 HYPERKALEMIA: ICD-10-CM

## 2020-10-09 LAB
A/G RATIO: 1.5 (ref 1.1–2.2)
ALBUMIN SERPL-MCNC: 3.9 G/DL (ref 3.4–5)
ALP BLD-CCNC: 86 U/L (ref 40–129)
ALT SERPL-CCNC: 13 U/L (ref 10–40)
ANION GAP SERPL CALCULATED.3IONS-SCNC: 8 MMOL/L (ref 3–16)
AST SERPL-CCNC: 19 U/L (ref 15–37)
BILIRUB SERPL-MCNC: 0.3 MG/DL (ref 0–1)
BUN BLDV-MCNC: 20 MG/DL (ref 7–20)
CALCIUM SERPL-MCNC: 9.8 MG/DL (ref 8.3–10.6)
CHLORIDE BLD-SCNC: 92 MMOL/L (ref 99–110)
CO2: 35 MMOL/L (ref 21–32)
CREAT SERPL-MCNC: 0.6 MG/DL (ref 0.6–1.2)
GFR AFRICAN AMERICAN: >60
GFR NON-AFRICAN AMERICAN: >60
GLOBULIN: 2.6 G/DL
GLUCOSE BLD-MCNC: 111 MG/DL (ref 70–99)
POTASSIUM SERPL-SCNC: 4.8 MMOL/L (ref 3.5–5.1)
SODIUM BLD-SCNC: 135 MMOL/L (ref 136–145)
TOTAL PROTEIN: 6.5 G/DL (ref 6.4–8.2)

## 2020-12-02 PROBLEM — I49.9 ARRHYTHMIA: Status: ACTIVE | Noted: 2020-12-02

## 2020-12-02 PROBLEM — N28.1 RENAL CYST: Status: ACTIVE | Noted: 2020-12-02

## 2020-12-02 PROBLEM — R60.1 GENERALIZED EDEMA: Status: ACTIVE | Noted: 2020-12-02

## 2020-12-02 PROBLEM — E87.5 HYPERKALEMIA: Status: ACTIVE | Noted: 2020-12-02

## 2020-12-04 ENCOUNTER — HOSPITAL ENCOUNTER (OUTPATIENT)
Dept: ULTRASOUND IMAGING | Age: 80
Discharge: HOME | End: 2020-12-04
Payer: MEDICARE

## 2020-12-04 PROCEDURE — 76775 US EXAM ABDO BACK WALL LIM: CPT
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
## 2020-12-18 ENCOUNTER — OFFICE VISIT (OUTPATIENT)
Dept: FAMILY MEDICINE CLINIC | Age: 80
End: 2020-12-18
Payer: MEDICARE

## 2020-12-18 VITALS
DIASTOLIC BLOOD PRESSURE: 60 MMHG | HEIGHT: 62 IN | RESPIRATION RATE: 20 BRPM | SYSTOLIC BLOOD PRESSURE: 102 MMHG | BODY MASS INDEX: 23.52 KG/M2 | OXYGEN SATURATION: 92 % | HEART RATE: 64 BPM | WEIGHT: 127.8 LBS | TEMPERATURE: 96.7 F

## 2020-12-18 PROCEDURE — 1036F TOBACCO NON-USER: CPT | Performed by: NURSE PRACTITIONER

## 2020-12-18 PROCEDURE — G8484 FLU IMMUNIZE NO ADMIN: HCPCS | Performed by: NURSE PRACTITIONER

## 2020-12-18 PROCEDURE — G0402 INITIAL PREVENTIVE EXAM: HCPCS | Performed by: NURSE PRACTITIONER

## 2020-12-18 PROCEDURE — G8420 CALC BMI NORM PARAMETERS: HCPCS | Performed by: NURSE PRACTITIONER

## 2020-12-18 PROCEDURE — 1090F PRES/ABSN URINE INCON ASSESS: CPT | Performed by: NURSE PRACTITIONER

## 2020-12-18 PROCEDURE — G8427 DOCREV CUR MEDS BY ELIG CLIN: HCPCS | Performed by: NURSE PRACTITIONER

## 2020-12-18 PROCEDURE — 99213 OFFICE O/P EST LOW 20 MIN: CPT | Performed by: NURSE PRACTITIONER

## 2020-12-18 PROCEDURE — G8400 PT W/DXA NO RESULTS DOC: HCPCS | Performed by: NURSE PRACTITIONER

## 2020-12-18 PROCEDURE — 4040F PNEUMOC VAC/ADMIN/RCVD: CPT | Performed by: NURSE PRACTITIONER

## 2020-12-18 PROCEDURE — 1123F ACP DISCUSS/DSCN MKR DOCD: CPT | Performed by: NURSE PRACTITIONER

## 2020-12-18 RX ORDER — AMOXICILLIN AND CLAVULANATE POTASSIUM 875; 125 MG/1; MG/1
1 TABLET, FILM COATED ORAL 2 TIMES DAILY
Qty: 14 TABLET | Refills: 0 | Status: SHIPPED | OUTPATIENT
Start: 2020-12-18 | End: 2020-12-25

## 2020-12-18 RX ORDER — ALBUTEROL SULFATE 90 UG/1
2 AEROSOL, METERED RESPIRATORY (INHALATION) 4 TIMES DAILY PRN
Qty: 3 INHALER | Refills: 5 | Status: SHIPPED | OUTPATIENT
Start: 2020-12-18 | End: 2022-01-03 | Stop reason: SDUPTHER

## 2020-12-18 RX ORDER — BACLOFEN 10 MG/1
10 TABLET ORAL PRN
COMMUNITY
Start: 2020-10-02 | End: 2022-01-03

## 2020-12-18 RX ORDER — FLUTICASONE FUROATE AND VILANTEROL 200; 25 UG/1; UG/1
1 POWDER RESPIRATORY (INHALATION) DAILY
Qty: 1 EACH | Refills: 5 | Status: SHIPPED | OUTPATIENT
Start: 2020-12-18 | End: 2021-01-17

## 2020-12-18 RX ORDER — LORAZEPAM 0.5 MG/1
0.5 TABLET ORAL DAILY
Qty: 30 TABLET | Refills: 2 | Status: SHIPPED | OUTPATIENT
Start: 2020-12-18 | End: 2021-01-17

## 2020-12-18 ASSESSMENT — PATIENT HEALTH QUESTIONNAIRE - PHQ9
SUM OF ALL RESPONSES TO PHQ QUESTIONS 1-9: 0
SUM OF ALL RESPONSES TO PHQ QUESTIONS 1-9: 0
SUM OF ALL RESPONSES TO PHQ9 QUESTIONS 1 & 2: 0
1. LITTLE INTEREST OR PLEASURE IN DOING THINGS: 0
SUM OF ALL RESPONSES TO PHQ QUESTIONS 1-9: 0
2. FEELING DOWN, DEPRESSED OR HOPELESS: 0

## 2020-12-18 ASSESSMENT — LIFESTYLE VARIABLES
HOW OFTEN DURING THE LAST YEAR HAVE YOU FAILED TO DO WHAT WAS NORMALLY EXPECTED FROM YOU BECAUSE OF DRINKING: 0
HOW OFTEN DO YOU HAVE A DRINK CONTAINING ALCOHOL: 2
AUDIT TOTAL SCORE: 2
HOW OFTEN DURING THE LAST YEAR HAVE YOU HAD A FEELING OF GUILT OR REMORSE AFTER DRINKING: 0
HAVE YOU OR SOMEONE ELSE BEEN INJURED AS A RESULT OF YOUR DRINKING: 0
HOW MANY STANDARD DRINKS CONTAINING ALCOHOL DO YOU HAVE ON A TYPICAL DAY: 0
HOW OFTEN DURING THE LAST YEAR HAVE YOU FOUND THAT YOU WERE NOT ABLE TO STOP DRINKING ONCE YOU HAD STARTED: 0
HOW OFTEN DURING THE LAST YEAR HAVE YOU NEEDED AN ALCOHOLIC DRINK FIRST THING IN THE MORNING TO GET YOURSELF GOING AFTER A NIGHT OF HEAVY DRINKING: 0
AUDIT-C TOTAL SCORE: 2
HOW OFTEN DO YOU HAVE SIX OR MORE DRINKS ON ONE OCCASION: 0
HAS A RELATIVE, FRIEND, DOCTOR, OR ANOTHER HEALTH PROFESSIONAL EXPRESSED CONCERN ABOUT YOUR DRINKING OR SUGGESTED YOU CUT DOWN: 0
HOW OFTEN DURING THE LAST YEAR HAVE YOU BEEN UNABLE TO REMEMBER WHAT HAPPENED THE NIGHT BEFORE BECAUSE YOU HAD BEEN DRINKING: 0

## 2020-12-18 NOTE — PROGRESS NOTES
Medicare Annual Wellness Visit  Name: Queen William Date: 2020   MRN: [id number] Sex: Female   Age: 79 y.o. Ethnicity: Non-/Non    : 1940 Race: Gely Marin is here for Medicare AWV (Pt here for medicare awv), Abdominal Pain (Pt states lower abdomen is still bothering her), and Sinus Problem (Pt c/o sinuses.)    Abdominal Pain  Patient complains of intermittent abdominal pain. The pain is located in the RLQ and in the LLQ. The pain is described as cramping and dull. Onset was 3-4 months ago. Symptoms have been unchanged since. She reports a hx of diverticulosis. She is unsure if certain foods are triggering symptoms. She has a hx of constipation but takes herbal supplements which help keep her regular. The patient denies anorexia, arthralgias, belching, chills, dysuria, fever, flatus, frequency, headache, hematochezia, hematuria, melena, myalgias, nausea, sweats and vomiting. She is not interested in referral to GI or colonoscopy at this time. Anxiety  She has generalized anxiety and has taken Ativan for years. Per [facility] Ativan was last filled 2020 . She does not take it everyday only when her anxiety is severe. She would like to have prescription refilled today    Upper Respiratory Infection  Patient complains of symptoms of a URI, possible sinusitis. Symptoms include headache,  congestion and sinus pain and pressure. Onset of symptoms was 2 weeks ago, unchanged since that time. She denies achiness, low grade fever, non productive cough, shortness of breath, sore throat and wheezing. Treatment to date: none. Screenings for behavioral, psychosocial and functional/safety risks, and cognitive dysfunction are all negative except as indicated below. These results, as well as other patient data from the [address] form, are documented in Flowsheets linked to this Encounter.     Allergies   Allergen Reactions    Nsaids  Hydrochlorothiazide      unknown    Iodine      unknown    Meloxicam          Prior to Visit Medications    Medication Sig Taking? Authorizing Provider   LORazepam (ATIVAN) 0.5 MG tablet Take 1 tablet by mouth daily for 30 days. Yes JEREMY Hua CNP   albuterol sulfate HFA (VENTOLIN HFA) 108 (90 Base) MCG/ACT inhaler Inhale 2 puffs into the lungs 4 times daily as needed for Wheezing Yes JEREMY Mercado CNP   amoxicillin-clavulanate (AUGMENTIN) 875-125 MG per tablet Take 1 tablet by mouth 2 times daily for 7 days Yes JEREMY Hua CNP   baclofen (LIORESAL) 10 MG tablet Take 10 mg by mouth as needed Pt states she takes half as needed. Historical Provider, MD   aspirin 81 MG EC tablet Take 81 mg by mouth daily  Historical Provider, MD   loratadine (CLARITIN) 10 MG tablet Take 10 mg by mouth as needed  Historical Provider, MD   furosemide (LASIX) 20 MG tablet Take 1 tablet by mouth daily  Patient taking differently: Take 40 mg by mouth daily   JEREMY Mercado CNP   pramipexole (MIRAPEX) 0.5 MG tablet Take 2 tablets by mouth 3 times daily  Patient taking differently: Take 0.5 mg by mouth 3 times daily   JEREMY Mercado CNP   fluticasone (FLONASE) 50 MCG/ACT nasal spray 1 spray by Each Nostril route daily  Patient taking differently: 1 spray by Each Nostril route daily Uses as needed  JEREMY Hua CNP   B Complex Vitamins (VITAMIN B COMPLEX) TABS Take 1 tablet by mouth 3 times daily  Historical Provider, MD   gabapentin (NEURONTIN) 100 MG capsule Take 100 mg by mouth daily as needed.  Taking as needed  Historical Provider, MD   lisinopril (PRINIVIL;ZESTRIL) 40 MG tablet Take 40 mg by mouth daily  Historical Provider, MD   NIFEdipine (PROCARDIA XL) 30 MG extended release tablet Take 30 mg by mouth daily  Historical Provider, MD   sotalol (BETAPACE) 80 MG tablet Take 80 mg by mouth 2 times daily  Historical Provider, MD [facility] PO Take 2 tablets by mouth 2 times daily  Historical Provider, MD   Magnesium Oxide 500 MG CAPS Take 500 mg by mouth daily  Historical Provider, MD   GENERIC EXTERNAL MEDICATION Take 1 Dose by mouth daily  Historical Provider, MD   acetaminophen (TYLENOL) 500 MG tablet acetaminophen TYLENOL EXTRA STRENGTH 500 MG TABS 2 po Q4H prn 2011/05/11 Mayank Carranza [phone] Jamel Cárdenas 05-  [facility] ([id number])  Historical Provider, MD   lansoprazole (PREVACID) 15 MG delayed release capsule Taking as needed  Historical Provider, MD         Past Medical History:   Diagnosis Date    Anxiety     H/O chronic respiratory failure 1/21/2020    High risk medication use 1/21/2020    HTN (hypertension)     Mitral valve prolapse     Paroxysmal atrial fibrillation (HCC)     Peripheral neuropathy 1/21/2020    Restrictive lung disease     chronic home O2 - 2 liters NC    Scoliosis        No past surgical history on file. Family History   Problem Relation Age of Onset    Cancer Other        CareTeam (Including outside providers/suppliers regularly involved in providing care):   Patient Care Team:  JEREMY Palacio CNP as PCP - General (Nurse Practitioner)  JEREMY Palacio CNP as PCP - [facility] Empaneled Provider    Wt Readings from Last 3 Encounters:   12/18/20 127 lb 12.8 oz (58 kg)   12/02/20 126 lb 9.6 oz (57.4 kg)   09/25/20 124 lb 3.2 oz (56.3 kg)     Vitals:    12/18/20 1346   BP: 102/60   Site: Right Upper Arm   Position: Sitting   Cuff Size: Medium Adult   Pulse: 64   Resp: 20   Temp: 96.7 °F (35.9 °C)   TempSrc: Infrared   SpO2: 92%   Weight: 127 lb 12.8 oz (58 kg)   Height: 5' 2\" (1.575 m)     Body mass index is 23.37 kg/m². Based upon direct observation of the patient, evaluation of cognition reveals recent and remote memory intact. General Appearance: alert and oriented to person, place and time, well-developed and well-nourished, in no acute distress and frail-appearing  Skin: warm and dry, no rash or erythema  ENT: tympanic membrane, external ear and ear canal normal bilaterally, oropharynx clear and moist with normal mucous membranes. Tenderness with palpation of the frontal and maxillary sinuses. Neck: neck supple and non tender without mass, no thyromegaly or thyroid nodules, no cervical lymphadenopathy   Pulmonary/Chest: clear to auscultation bilaterally- no wheezes, rales or rhonchi, normal air movement, no respiratory distress  Cardiovascular: normal rate, normal S1 and S2, no gallops, intact distal pulses and no carotid bruits  Abdomen: soft, non-tender, non-distended, normal bowel sounds, no masses or organomegaly  Extremities: no erythema, swelling or tenderness of extremities  Musculoskeletal: ROM-  limited flexion, limited extension, osteoarthritic changes noted in both hands, tenderness present over  thoracic spine and lumbar spine and paraspinal muscle tenderness present-  bilateral thoracic, lumbar  Neurologic: reflexes normal and symmetric, no cranial nerve deficit, speech normal and patient uses a walker to assist with ambulation. Patient's complete Health Risk Assessment and screening values have been reviewed and are found in Flowsheets. The following problems were reviewed today and where indicated follow up appointments were made and/or referrals ordered. Positive Risk Factor Screenings with Interventions:            General Health and ACP:  General  In general, how would you say your health is?: Good  In the past 7 days, have you experienced any of the following?  New or Increased Pain, New or Increased Fatigue, Loneliness, Social Isolation, Stress or Anger?: (!) New or Increased Pain  Do you get the social and emotional support that you need?: Yes  Do you have a Living Will?: Yes  Advance Directives Power of [employer] Living Will ACP-Advance Directive ACP-Power of     Not on File Not on File Not on File Not on File      General Health Risk Interventions:  · Pain issues: Patient has recently established with pain management and she is considering injections. Health Habits/Nutrition:  Health Habits/Nutrition  Do you exercise for at least 20 minutes 2-3 times per week?: (!) No  Have you lost any weight without trying in the past 3 months?: No  Do you eat fewer than 2 meals per day?: No  Have you seen a dentist within the past year?: Yes  Body mass index: 23.37  Health Habits/Nutrition Interventions:  · Inadequate physical activity:  patient is not ready to increase his/her physical activity level at this time    Hearing/Vision:  No exam data present  Hearing/Vision  Do you or your family notice any trouble with your hearing?: No  Do you have difficulty driving, watching TV, or doing any of your daily activities because of your eyesight?: (!) Yes  Have you had an eye exam within the past year?: Yes  Hearing/Vision Interventions:  · Vision concerns:  patient encouraged to make appointment with his/her eye specialist     ADL:  ADLs  In the past 7 days, did you need help from others to perform any of the following everyday activities? Eating, dressing, grooming, bathing, toileting, or walking/balance?: None  In the past 7 days, did you need help from others to take care of any of the following?  Laundry, housekeeping, banking/finances, shopping, telephone use, food preparation, transportation, or taking medications?: [employer], Housekeeping, Shopping, Transportation  ADL Interventions:  · Patient declines any further evaluation/treatment for this issue    Personalized Preventive Plan   Current Health Maintenance Status  Immunization History   Administered Date(s) Administered    Pneumococcal Polysaccharide ([id number]) 06/09/2008    Tdap (Boostrix, Adacel) 08/19/2010        Health Maintenance Topic Date Due    Annual Wellness Visit (AWV)  02/05/2020    DEXA (modify frequency per FRAX score)  12/18/2021 (Originally 2/21/1996)    DTaP/Tdap/Td vaccine (2 - Td) 12/18/2021 (Originally 8/19/2020)    Flu vaccine (1) 12/18/2021 (Originally 9/1/2020)    Shingles Vaccine (1 of 2) 12/18/2021 (Originally 2/21/1991)    Potassium monitoring  10/09/2021    Creatinine monitoring  10/09/2021    Pneumococcal 65+ years Vaccine  Completed    Hepatitis A vaccine  Aged Out    Hepatitis B vaccine  Aged Out    Hib vaccine  Aged Out    Meningococcal (ACWY) vaccine  Aged Out     Recommendations for [employer] Due: see orders and patient instructions/AVS.  . Recommended screening schedule for the next 5-10 years is provided to the patient in written form: see Patient Instructions/AVS.    Tan Singh was seen today for medicare awv, abdominal pain and sinus problem. Diagnoses and all orders for this visit:    Routine general medical examination at a health care facility  Continue efforts at regular exercise, healthy diet and adequate sleep. Lower abdominal pain   · Include fruits, leafy green vegetables, beans, and whole grains in your diet each day. These foods are high in fiber. · Take a fiber supplement, such as Citrucel or Metamucil, every day if needed. Read and follow all instructions on the label. · Drink plenty of fluids, enough so that your urine is light yellow or clear like water. If you have kidney, heart, or liver disease and have to limit fluids, talk with your doctor before you increase the amount of fluids you drink. · Cut out foods that cause gas, pain, or other symptoms    Acute non-recurrent pansinusitis  · Breathe warm, moist air from a steamy shower, a hot bath, or a sink filled with hot water. Avoid cold, dry air. Using a humidifier in your home may help. Follow the directions for cleaning the machine. · Use saline (saltwater) nasal washes to help keep your nasal passages open and wash out mucus and bacteria. You can buy saline nose drops at a grocery store or drugstore. Insert the tip into your nostril, and squeeze gently. Wick Settles your nose. · Put a hot, wet towel or a warm gel pack on your face 3 or 4 times a day for 5 to 10 minutes each time. · Try a decongestant nasal spray like oxymetazoline (Afrin). Do not use it for more than 3 days in a row. Using it for more than 3 days can make your congestion worse. -     amoxicillin-clavulanate (AUGMENTIN) 875-125 MG per tablet; Take 1 tablet by mouth 2 times daily for 7 days    Anxiety  -     LORazepam (ATIVAN) 0.5 MG tablet; Take 1 tablet by mouth daily for 30 days. Restrictive lung disease  -     albuterol sulfate HFA (VENTOLIN HFA) 108 (90 Base) MCG/ACT inhaler; Inhale 2 puffs into the lungs 4 times daily as needed for Wheezing    Controlled Substance Monitoring:    Acute and Chronic Pain Monitoring:   RX Monitoring 12/18/2020   Periodic Controlled Substance Monitoring Possible medication side effects, risk of tolerance/dependence & alternative treatments discussed. ;No signs of potential drug abuse or diversion identified. ;Assessed functional status. ;Obtaining appropriate analgesic effect of treatment.

## 2020-12-18 NOTE — PATIENT INSTRUCTIONS
Personalized Preventive Plan for Pretty Begun - 12/18/2020  Medicare offers a range of preventive health benefits. Some of the tests and screenings are paid in full while other may be subject to a deductible, co-insurance, and/or copay. Some of these benefits include a comprehensive review of your medical history including lifestyle, illnesses that may run in your family, and various assessments and screenings as appropriate. After reviewing your medical record and screening and assessments performed today your provider may have ordered immunizations, labs, imaging, and/or referrals for you. A list of these orders (if applicable) as well as your Preventive Care list are included within your After Visit Summary for your review. Other Preventive Recommendations:    · A preventive eye exam performed by an eye specialist is recommended every 1-2 years to screen for glaucoma; cataracts, macular degeneration, and other eye disorders. · A preventive dental visit is recommended every 6 months. · Try to get at least 150 minutes of exercise per week or 10,000 steps per day on a pedometer . · Order or download the FREE \"Exercise & Physical Activity: Your Everyday Guide\" from [employer]. Call 1-478.515.7590 or search [employer] online. · You need 0519-1901 mg of calcium and 7797-9201 IU of vitamin D per day. It is possible to meet your calcium requirement with diet alone, but a vitamin D supplement is usually necessary to meet this goal.  · When exposed to the sun, use a sunscreen that protects against both UVA and UVB radiation with an SPF of 30 or greater. Reapply every 2 to 3 hours or after sweating, drying off with a towel, or swimming. · Always wear a seat belt when traveling in a car. Always wear a helmet when riding a bicycle or motorcycle.

## 2020-12-21 PROBLEM — R10.30 LOWER ABDOMINAL PAIN: Status: ACTIVE | Noted: 2020-12-21

## 2021-03-05 ENCOUNTER — OFFICE VISIT (OUTPATIENT)
Dept: FAMILY MEDICINE CLINIC | Age: 81
End: 2021-03-05
Payer: MEDICARE

## 2021-03-05 VITALS
SYSTOLIC BLOOD PRESSURE: 100 MMHG | HEART RATE: 69 BPM | TEMPERATURE: 96.8 F | DIASTOLIC BLOOD PRESSURE: 62 MMHG | RESPIRATION RATE: 16 BRPM | OXYGEN SATURATION: 94 % | WEIGHT: 127 LBS | HEIGHT: 62 IN | BODY MASS INDEX: 23.37 KG/M2

## 2021-03-05 DIAGNOSIS — J01.10 ACUTE NON-RECURRENT FRONTAL SINUSITIS: ICD-10-CM

## 2021-03-05 DIAGNOSIS — J30.9 ALLERGIC RHINITIS, UNSPECIFIED SEASONALITY, UNSPECIFIED TRIGGER: ICD-10-CM

## 2021-03-05 DIAGNOSIS — J98.4 RESTRICTIVE LUNG DISEASE: Primary | ICD-10-CM

## 2021-03-05 PROCEDURE — 99213 OFFICE O/P EST LOW 20 MIN: CPT | Performed by: NURSE PRACTITIONER

## 2021-03-05 PROCEDURE — G8484 FLU IMMUNIZE NO ADMIN: HCPCS | Performed by: NURSE PRACTITIONER

## 2021-03-05 PROCEDURE — 1123F ACP DISCUSS/DSCN MKR DOCD: CPT | Performed by: NURSE PRACTITIONER

## 2021-03-05 PROCEDURE — G8427 DOCREV CUR MEDS BY ELIG CLIN: HCPCS | Performed by: NURSE PRACTITIONER

## 2021-03-05 PROCEDURE — 1036F TOBACCO NON-USER: CPT | Performed by: NURSE PRACTITIONER

## 2021-03-05 PROCEDURE — 1090F PRES/ABSN URINE INCON ASSESS: CPT | Performed by: NURSE PRACTITIONER

## 2021-03-05 PROCEDURE — G8400 PT W/DXA NO RESULTS DOC: HCPCS | Performed by: NURSE PRACTITIONER

## 2021-03-05 PROCEDURE — G8420 CALC BMI NORM PARAMETERS: HCPCS | Performed by: NURSE PRACTITIONER

## 2021-03-05 PROCEDURE — 4040F PNEUMOC VAC/ADMIN/RCVD: CPT | Performed by: NURSE PRACTITIONER

## 2021-03-05 RX ORDER — AMOXICILLIN AND CLAVULANATE POTASSIUM 875; 125 MG/1; MG/1
1 TABLET, FILM COATED ORAL 2 TIMES DAILY
Qty: 14 TABLET | Refills: 0 | Status: SHIPPED | OUTPATIENT
Start: 2021-03-05 | End: 2021-03-12

## 2021-03-05 RX ORDER — FUROSEMIDE 40 MG/1
TABLET ORAL
COMMUNITY
Start: 2021-02-09 | End: 2022-10-27

## 2021-03-05 RX ORDER — MONTELUKAST SODIUM 10 MG/1
10 TABLET ORAL DAILY
Qty: 30 TABLET | Refills: 3 | Status: SHIPPED | OUTPATIENT
Start: 2021-03-05 | End: 2022-01-03

## 2021-03-05 RX ORDER — LORAZEPAM 0.5 MG/1
0.5 TABLET ORAL DAILY PRN
COMMUNITY
Start: 2021-02-20 | End: 2021-04-22

## 2021-03-05 RX ORDER — BUDESONIDE AND FORMOTEROL FUMARATE DIHYDRATE 160; 4.5 UG/1; UG/1
2 AEROSOL RESPIRATORY (INHALATION) 2 TIMES DAILY
COMMUNITY
End: 2022-01-03

## 2021-03-05 RX ORDER — METHOCARBAMOL 500 MG/1
750 TABLET, FILM COATED ORAL PRN
COMMUNITY

## 2021-03-05 ASSESSMENT — ENCOUNTER SYMPTOMS
GASTROINTESTINAL NEGATIVE: 1
SORE THROAT: 0
COUGH: 0
WHEEZING: 1
SINUS PAIN: 1
SINUS PRESSURE: 1
SHORTNESS OF BREATH: 1
VOICE CHANGE: 0
TROUBLE SWALLOWING: 0
BACK PAIN: 1
RHINORRHEA: 1

## 2021-03-05 ASSESSMENT — PATIENT HEALTH QUESTIONNAIRE - PHQ9
1. LITTLE INTEREST OR PLEASURE IN DOING THINGS: 1
SUM OF ALL RESPONSES TO PHQ QUESTIONS 1-9: 2
SUM OF ALL RESPONSES TO PHQ QUESTIONS 1-9: 2
SUM OF ALL RESPONSES TO PHQ9 QUESTIONS 1 & 2: 2

## 2021-03-05 NOTE — PROGRESS NOTES
Subjective:      Chief Complaint   Patient presents with    Other     Pt here for oxygen testing        HPI:  Jaja Lucero is a [de-identified] y.o. female who presents today to evaluate the need for home oxygen. She has been diagnosed with restrictive lung disease and  is on chronic continuous home O2, 2 liters nasal cannula with activity and at rest. She is prescribed the following inhalers: Ventolin HFA prn for shortness of breath and/or wheezing and Symbicort daily. Upper Respiratory Infection  Patient complains of symptoms of a URI, possible sinusitis. Symptoms include achiness, headache,  facial pain, sinus pain/pressure, post nasal drip, rhinorrhea and sneezing for the past 2 weeks . Treatment to date: nasal decongestant spray, antihistamines, which has been  not very effective. She does not feel that allergy symptoms are currently well managed and this has led to frequent sinus infections recently. Past Medical History:   Diagnosis Date    Anxiety     H/O chronic respiratory failure 1/21/2020    High risk medication use 1/21/2020    HTN (hypertension)     Mitral valve prolapse     Paroxysmal atrial fibrillation (HCC)     Peripheral neuropathy 1/21/2020    Restrictive lung disease     chronic home O2 - 2 liters NC    Scoliosis         Social History     Tobacco Use    Smoking status: Never Smoker    Smokeless tobacco: Never Used   Substance Use Topics    Alcohol use: Yes     Comment: occaisonal wine        Review of Systems   Constitutional: Positive for fatigue. HENT: Positive for postnasal drip, rhinorrhea, sinus pressure, sinus pain and sneezing. Negative for congestion, ear pain, sore throat, trouble swallowing and voice change. Respiratory: Positive for shortness of breath and wheezing. Negative for cough. Patient with restrictive lung disease - symptoms are baseline     Cardiovascular: Negative. Gastrointestinal: Negative. Endocrine: Negative.     Musculoskeletal: Positive for arthralgias, back pain, gait problem and myalgias. Skin: Negative. Negative for rash. Neurological: Positive for weakness. Negative for dizziness, tremors, seizures, syncope, facial asymmetry, speech difficulty, light-headedness, numbness and headaches. Psychiatric/Behavioral: Negative for decreased concentration, dysphoric mood, sleep disturbance and suicidal ideas. The patient is nervous/anxious. The patient is not hyperactive. Objective:      /62 (Site: Right Upper Arm, Position: Sitting, Cuff Size: Medium Adult)   Pulse 69   Temp 96.8 °F (36 °C) (Infrared)   Resp 16   Ht 5' 2\" (1.575 m)   Wt 127 lb (57.6 kg)   SpO2 94% Comment: Pt without oxygen on o2 drops to 84. BMI 23.23 kg/m²      Physical Exam  Vitals signs reviewed. HENT:      Right Ear: Tympanic membrane, ear canal and external ear normal.      Left Ear: Tympanic membrane, ear canal and external ear normal.      Nose: Rhinorrhea present. Right Turbinates: Not swollen or pale. Left Turbinates: Not swollen or pale. Right Sinus: Frontal sinus tenderness present. No maxillary sinus tenderness. Left Sinus: Frontal sinus tenderness present. No maxillary sinus tenderness. Mouth/Throat:      Mouth: Mucous membranes are moist.      Pharynx: Oropharynx is clear. Eyes:      General: Allergic shiner present. Extraocular Movements: Extraocular movements intact. Conjunctiva/sclera: Conjunctivae normal.      Pupils: Pupils are equal, round, and reactive to light. Neck:      Musculoskeletal: Normal range of motion and neck supple. Thyroid: No thyroid mass, thyromegaly or thyroid tenderness. Vascular: No carotid bruit. Cardiovascular:      Rate and Rhythm: Normal rate and regular rhythm. Pulses: Normal pulses. Heart sounds: Normal heart sounds. Pulmonary:      Effort: Pulmonary effort is normal.      Breath sounds: Decreased breath sounds (throughout) present.  No wheezing, rhonchi or rales. Abdominal:      General: Bowel sounds are normal.      Palpations: Abdomen is soft. There is no mass. Hernia: No hernia is present. Feet:      Right foot:      Skin integrity: Skin integrity normal.      Left foot:      Skin integrity: Skin integrity normal.      Toenail Condition: Left toenails are ingrown. Skin:     General: Skin is warm and dry. Capillary Refill: Capillary refill takes less than 2 seconds. Neurological:      General: No focal deficit present. Mental Status: She is alert. Cranial Nerves: Cranial nerves are intact. Sensory: Sensory deficit (bilateral LE with decreased sensation) present. Motor: Weakness (RUE and bilateral LE) present. Gait: Gait abnormal (she is using a walker). Psychiatric:         Attention and Perception: Attention normal.         Mood and Affect: Mood and affect normal.         Behavior: Behavior normal. Behavior is cooperative. Cognition and Memory: Cognition and memory normal.            Assessment / Plan:      1. Restrictive lung disease  Patient's oxygen saturation is 83 % on room air. 2 liters oxygen applied via nasal cannula and oxygen saturation increased to 94%. Prescription for 2lpm continuous oxygen via nasal cannula. 2. Acute non-recurrent frontal sinusitis  · Breathe warm, moist air from a steamy shower, a hot bath, or a sink filled with hot water. Avoid cold, dry air. Using a humidifier in your home may help. Follow the directions for cleaning the machine. · Use saline (saltwater) nasal washes to help keep your nasal passages open and wash out mucus and bacteria. You can buy saline nose drops at a grocery store or drugstore. Insert the tip into your nostril, and squeeze gently. Anderson Hanson your nose. · Put a hot, wet towel or a warm gel pack on your face 3 or 4 times a day for 5 to 10 minutes each time. · Try a decongestant nasal spray like oxymetazoline (Afrin).  Do not use it for more than 3 days in a row. Using it for more than 3 days can make your congestion worse. - amoxicillin-clavulanate (AUGMENTIN) 875-125 MG per tablet; Take 1 tablet by mouth 2 times daily for 7 days  Dispense: 14 tablet; Refill: 0    3. Allergic rhinitis, unspecified seasonality, unspecified trigger  Continue Flonase daily, Claritin OTC as needed. Will start Singulair.   - montelukast (SINGULAIR) 10 MG tablet; Take 1 tablet by mouth daily  Dispense: 30 tablet;  Refill: 3    Kiya Griffith Record, APRN - CNP

## 2021-04-01 RX ORDER — SOTALOL HYDROCHLORIDE 80 MG/1
80 TABLET ORAL 2 TIMES DAILY
Qty: 60 TABLET | Refills: 0 | Status: SHIPPED | OUTPATIENT
Start: 2021-04-01 | End: 2022-10-27

## 2021-04-22 DIAGNOSIS — F41.9 ANXIETY: Primary | ICD-10-CM

## 2021-04-22 RX ORDER — LORAZEPAM 0.5 MG/1
TABLET ORAL
Qty: 30 TABLET | Refills: 2 | Status: SHIPPED | OUTPATIENT
Start: 2021-04-22 | End: 2021-06-11 | Stop reason: SDUPTHER

## 2021-06-11 DIAGNOSIS — F41.9 ANXIETY: ICD-10-CM

## 2021-06-11 RX ORDER — LORAZEPAM 0.5 MG/1
0.5 TABLET ORAL DAILY PRN
Qty: 30 TABLET | Refills: 2 | Status: SHIPPED | OUTPATIENT
Start: 2021-06-11 | End: 2021-07-11

## 2021-07-27 ENCOUNTER — TELEPHONE (OUTPATIENT)
Dept: FAMILY MEDICINE CLINIC | Age: 81
End: 2021-07-27

## 2021-07-27 NOTE — TELEPHONE ENCOUNTER
Patient saw Cardiology last week 7/20/21. This past weekend patient had an episode where she woke up out of a sound sleep and was unaware of what day it was. No episodes since then. She has had a lingering headache, but says it is nothing severe. Patient had called for an appointment today, but called back to let us know she had a doctor coming to the house to check her.

## 2021-08-03 ENCOUNTER — HOSPITAL ENCOUNTER (OUTPATIENT)
Age: 81
Discharge: HOME | End: 2021-08-03
Payer: MEDICARE

## 2021-08-03 ENCOUNTER — HOSPITAL ENCOUNTER (OUTPATIENT)
Dept: GENERAL RADIOLOGY | Age: 81
Discharge: HOME | End: 2021-08-03
Payer: MEDICARE

## 2021-08-03 DIAGNOSIS — M41.00 INFANTILE IDIOPATHIC SCOLIOSIS, UNSPECIFIED SPINAL REGION: ICD-10-CM

## 2021-08-03 LAB
ANION GAP SERPL CALCULATED.3IONS-SCNC: ABNORMAL MMOL/L (ref 4–16)
BUN BLDV-MCNC: 12 MG/DL (ref 6–23)
CALCIUM SERPL-MCNC: 9.6 MG/DL (ref 8.3–10.6)
CHLORIDE BLD-SCNC: 84 MMOL/L (ref 99–110)
CO2: 45 MMOL/L (ref 21–32)
CREAT SERPL-MCNC: 0.5 MG/DL (ref 0.6–1.1)
GFR AFRICAN AMERICAN: >60 ML/MIN/1.73M2
GFR NON-AFRICAN AMERICAN: >60 ML/MIN/1.73M2
GLUCOSE FASTING: 101 MG/DL (ref 70–99)
MAGNESIUM: 2.1 MG/DL (ref 1.8–2.4)
POTASSIUM SERPL-SCNC: 4.4 MMOL/L (ref 3.5–5.1)
SODIUM BLD-SCNC: 128 MMOL/L (ref 135–145)
TSH HIGH SENSITIVITY: 0.94 UIU/ML (ref 0.27–4.2)

## 2021-08-03 PROCEDURE — 83735 ASSAY OF MAGNESIUM: CPT

## 2021-08-03 PROCEDURE — 84443 ASSAY THYROID STIM HORMONE: CPT

## 2021-08-03 PROCEDURE — 36415 COLL VENOUS BLD VENIPUNCTURE: CPT

## 2021-08-03 PROCEDURE — 72082 X-RAY EXAM ENTIRE SPI 2/3 VW: CPT

## 2021-08-03 PROCEDURE — 80048 BASIC METABOLIC PNL TOTAL CA: CPT

## 2021-08-05 ENCOUNTER — HOSPITAL ENCOUNTER (OUTPATIENT)
Age: 81
Discharge: HOME | End: 2021-08-05
Payer: MEDICARE

## 2021-08-05 LAB
ANION GAP SERPL CALCULATED.3IONS-SCNC: 2 MMOL/L (ref 4–16)
BUN BLDV-MCNC: 14 MG/DL (ref 6–23)
CALCIUM SERPL-MCNC: 9.2 MG/DL (ref 8.3–10.6)
CHLORIDE BLD-SCNC: 84 MMOL/L (ref 99–110)
CO2: 42 MMOL/L (ref 21–32)
CREAT SERPL-MCNC: 0.6 MG/DL (ref 0.6–1.1)
GFR AFRICAN AMERICAN: >60 ML/MIN/1.73M2
GFR NON-AFRICAN AMERICAN: >60 ML/MIN/1.73M2
GLUCOSE BLD-MCNC: 107 MG/DL (ref 70–99)
POTASSIUM SERPL-SCNC: 3.8 MMOL/L (ref 3.5–5.1)
SODIUM BLD-SCNC: 128 MMOL/L (ref 135–145)

## 2021-08-05 PROCEDURE — 36415 COLL VENOUS BLD VENIPUNCTURE: CPT

## 2021-08-05 PROCEDURE — 80048 BASIC METABOLIC PNL TOTAL CA: CPT

## 2021-09-14 ENCOUNTER — NURSE TRIAGE (OUTPATIENT)
Dept: OTHER | Facility: CLINIC | Age: 81
End: 2021-09-14

## 2021-09-14 NOTE — TELEPHONE ENCOUNTER
Received call from [id number] at [facility] with Red Flag Complaint. Brief description of triage: See below    Triage indicates for patient to see HCP within 4 hours (Or PCP triage). This writer called [facility] to speak directly with a provider for 2nd level triage. 2nd level triage completed with Moi Varma NP as relayed through Irvin Garsia; provider recommends patient be seen at a walk-in clinic today for further testing. Care advice provided, patient verbalizes understanding; denies any other questions or concerns; instructed to call back for any new or worsening symptoms. Attention Provider: Thank you for allowing me to participate in the care of your patient. The patient was connected to triage in response to information provided to the [facility]. Please do not respond through this encounter as the response is not directed to a shared pool. Reason for Disposition   [1] MODERATE weakness (i.e., interferes with work, school, normal activities) AND [2] cause unknown  (Exceptions: weakness with acute minor illness, or weakness from poor fluid intake)    Answer Assessment - Initial Assessment Questions  1. DESCRIPTION: \"Describe how you are feeling. \"      \"I don't feel like eating or doing anything\". Intermittent \"shaking all over\". \"If I'm walking it makes me sway from side to side. \"    2. SEVERITY: \"How bad is it? \"  \"Can you stand and walk? \"    - MILD - Feels weak or tired, but does not interfere with work, school or normal activities    - [facility] to stand and walk; weakness interferes with work, school, or normal activities    - SEVERE - Unable to stand or walk      Moderate    3. ONSET:  \"When did the weakness begin? \"      Sunday    4. CAUSE: \"What do you think is causing the weakness? \"      Electrolytes? 5. MEDICINES: [place] you recently started a new medicine or had a change in the amount of a medicine? \"      Denies    6. OTHER SYMPTOMS: \"Do you have any other symptoms? \" (e.g., chest pain, fever, cough, SOB, vomiting, diarrhea, bleeding, other areas of pain)      Chronic SOB, nausea last night    7. PREGNANCY: \"Is there any chance you are pregnant? \" \"When was your last menstrual period? \"      NA    Protocols used: WEAKNESS (GENERALIZED) AND FATIGUE-ADULT-AH

## 2021-09-14 NOTE — TELEPHONE ENCOUNTER
Ws contacted by Radha Mead in regards to patient condition, advised physician was not in the office, but I would ocntact her directly by phone for further recommendation. Spoke with PCP and she advised for patient to be seen at a walk-in clinic/ UC to be evaluated.  Spoke with Jhonny Ceron again who would advise patient of provider recommendation

## 2021-09-22 ENCOUNTER — TELEPHONE (OUTPATIENT)
Dept: FAMILY MEDICINE CLINIC | Age: 81
End: 2021-09-22

## 2021-09-22 NOTE — TELEPHONE ENCOUNTER
Called patient's daughter-she wanted to see about moving patient's appt to this Thursday or friday-I explained there was no available time-she verbalizes understanding

## 2021-09-22 NOTE — TELEPHONE ENCOUNTER
----- Message from Zaida Patel sent at 9/22/2021 11:40 AM EDT -----  Subject: Message to Provider    QUESTIONS  Information for Provider? Qian Agnieszka ( daughter) would like to change [facility] follow up appt. to tomorrow in the afternoon, if this isn't   possible she will keep the appt. as is. Call Qian Aaron at this number   [phone]  ---------------------------------------------------------------------------  --------------  Nikaminerva STEWART  What is the best way for the office to contact you? OK to leave message on   voicemail  Preferred Call Back Phone Number? [phone]  ---------------------------------------------------------------------------  --------------  SCRIPT ANSWERS  Relationship to Patient? Other  Representative Name? Qian Aaron  Is the Representative on the appropriate HIPAA document in Epic?  Yes

## 2021-09-23 ENCOUNTER — TELEPHONE (OUTPATIENT)
Dept: FAMILY MEDICINE CLINIC | Age: 81
End: 2021-09-23

## 2021-09-23 NOTE — TELEPHONE ENCOUNTER
Please call patient and offer her the 3pm appointment today. If she is not able to make today please double book the 10 am appointment on Monday the 27th.

## 2021-09-23 NOTE — TELEPHONE ENCOUNTER
Caregiver returned call to office and will keep previously planned appointment with PCP as scheduled. Reports a Dr Jayna Echeverria with [facility] thru [facility] will be doing a home visits 9/24/21.

## 2021-09-23 NOTE — TELEPHONE ENCOUNTER
This nurse called and left a message requesting a call back to the [facility] office at that time will offer to schedule appointment as recommended by PCP.

## 2021-09-23 NOTE — TELEPHONE ENCOUNTER
Spoke with jorje and she decided to cancel Heidi's appointment on Tuesday and they will call if an appointment is needed

## 2021-09-28 ENCOUNTER — TELEPHONE (OUTPATIENT)
Dept: FAMILY MEDICINE CLINIC | Age: 81
End: 2021-09-28

## 2021-09-30 NOTE — TELEPHONE ENCOUNTER
Called and spoke with Joseph Potter at [phone]. She will send orders under Demetrio Molina and I will have covering physicians sign for her.

## 2021-11-01 ENCOUNTER — HOSPITAL ENCOUNTER (OUTPATIENT)
Dept: MAMMOGRAPHY | Age: 81
Discharge: HOME | End: 2021-11-01
Payer: MEDICARE

## 2021-11-01 DIAGNOSIS — Z79.51 LONG TERM CURRENT USE OF INHALED STEROID: ICD-10-CM

## 2021-11-01 PROCEDURE — 77080 DXA BONE DENSITY AXIAL: CPT

## 2021-11-08 ENCOUNTER — TELEPHONE (OUTPATIENT)
Dept: FAMILY MEDICINE CLINIC | Age: 81
End: 2021-11-08

## 2021-11-08 NOTE — TELEPHONE ENCOUNTER
----- Message from Emily Church sent at 11/8/2021  2:49 PM EST -----  Subject: Refill Request    QUESTIONS  Name of Medication? LORazepam (ATIVAN) 0.5 MG tablet  Patient-reported dosage and instructions? prescribe three pills a day   How many days do you have left? 0  Preferred Pharmacy? [address] [facility] phone number (if available)? [phone]  Additional Information for Provider? Pt is out of meds. ---------------------------------------------------------------------------  --------------  Rosalva STEWART  What is the best way for the office to contact you? OK to leave message on   voicemail  Preferred Call Back Phone Number?  [phone]

## 2021-11-09 ENCOUNTER — TELEPHONE (OUTPATIENT)
Dept: FAMILY MEDICINE CLINIC | Age: 81
End: 2021-11-09

## 2021-11-09 NOTE — TELEPHONE ENCOUNTER
Patient needs to have an appointment with Jerold Hodgkin for a refill on this medication. Looks like last filled in July, so not something she is taking regularly.

## 2021-11-09 NOTE — TELEPHONE ENCOUNTER
----- Message from Benjamín Minor sent at 11/9/2021 10:39 AM EST -----  Subject: Appointment Request    Reason for Call: Routine Hospital Follow Up    QUESTIONS  Type of Appointment? Established Patient  Reason for appointment request? Available appointments did not meet   patient need  Additional Information for Provider? Kulwant Estrada was seen at [address] [place] [address]   for trouble breathing a few weeks ago. She is doing well now, but needs a   hospital follow up and medication refill. The first opening I am seeing is   in December. Was offered Thursday but I am not seeing anything there. Please call to schedule.   ---------------------------------------------------------------------------  --------------  CALL BACK INFO  What is the best way for the office to contact you? OK to leave message on   voicemail  Preferred Call Back Phone Number? [phone]  ---------------------------------------------------------------------------  --------------  SCRIPT ANSWERS  Relationship to Patient? Self  (Patient requests to see provider urgently. )? No  (Has the patient been discharged from the hospital within 2 business days   AND does not have a Telephone Encounter  Follow Up From [address] [place] [address]   documented in [address]?)? No  Do you have any questions for your primary care provider that need to be   answered prior to your appointment? (Use RN Triage if question pertains to   anything on the red flag list)? No  (Patient needs follow up visit after hospital discharge) Book first   available appointment within 7 days OF DISCHARGE, if no appt, proceed to   book the next available time slot within 14 days OF DISCHARGE AND Send   Message to Provider. [address] [facility] Follow Up appointment cannot be booked   beyond 14 Days and should result in a Message to Provider. ? Yes   Have you been diagnosed with, awaiting test results for, or told that you   are suspected of having COVID-19 (Coronavirus)?  (If patient has tested   negative or was tested as a requirement for work, school, or travel and   not based on symptoms, answer no)? No  Within the past two weeks have you developed any of the following symptoms   (answer no if symptoms have been present longer than 2 weeks or began   more than 2 weeks ago)? Fever or Chills, Cough, Shortness of breath or   difficulty breathing, Loss of taste or smell, Sore throat, Nasal   congestion, Sneezing or runny nose, Fatigue or generalized body aches   (answer no if pain is specific to a body part e.g. back pain), Diarrhea,   Headache? No  Have you had close contact with someone with COVID-19 in the last 14 days? No  (Service Expert  click yes below to proceed with [employer] As Usual   Scheduling)?  Yes

## 2021-11-09 NOTE — TELEPHONE ENCOUNTER
Called patient and advised of melanie response. Pt advised she does not take regularly, however when she needs it she needs it. I advised to pt we would need an apt. Pt advised she will call back to schedule something.

## 2021-11-23 ENCOUNTER — TELEPHONE (OUTPATIENT)
Dept: FAMILY MEDICINE CLINIC | Age: 81
End: 2021-11-23

## 2021-11-23 NOTE — TELEPHONE ENCOUNTER
----- Message from Love Orellana sent at 11/22/2021 12:04 PM EST -----  Subject: Appointment Request    Reason for Call: Routine Existing Condition Follow Up    QUESTIONS  Type of Appointment? Established Patient  Reason for appointment request? No appointments available during search  Additional Information for Provider? Needs to come in for refill on   Lorazepam this week or next. Cannot wait until Jan. Please call  ---------------------------------------------------------------------------  --------------  CALL BACK INFO  What is the best way for the office to contact you? OK to leave message on   voicemail  Preferred Call Back Phone Number? [phone]  ---------------------------------------------------------------------------  --------------  SCRIPT ANSWERS  Relationship to Patient? Self  (Is the patient requesting to be seen urgently for their symptoms?)? No  Is this follow up request related to routine Diabetes Management? No  Are you having any new concerns about your existing condition? No  Have you been diagnosed with, awaiting test results for, or told that you   are suspected of having COVID-19 (Coronavirus)? (If patient has tested   negative or was tested as a requirement for work, school, or travel and   not based on symptoms, answer no)? No  Within the past two weeks have you developed any of the following symptoms   (answer no if symptoms have been present longer than 2 weeks or began   more than 2 weeks ago)? Fever or Chills, Cough, Shortness of breath or   difficulty breathing, Loss of taste or smell, Sore throat, Nasal   congestion, Sneezing or runny nose, Fatigue or generalized body aches   (answer no if pain is specific to a body part e.g. back pain), Diarrhea,   Headache? No  Have you had close contact with someone with COVID-19 in the last 14 days? No  (Service Expert  click yes below to proceed with [facility] [employer] As Usual   Scheduling)?  Yes

## 2021-12-20 ENCOUNTER — NURSE TRIAGE (OUTPATIENT)
Dept: OTHER | Facility: CLINIC | Age: 81
End: 2021-12-20

## 2021-12-21 ENCOUNTER — TELEPHONE (OUTPATIENT)
Dept: FAMILY MEDICINE CLINIC | Age: 81
End: 2021-12-21

## 2021-12-21 NOTE — TELEPHONE ENCOUNTER
----- Message from Saba Guan sent at 12/20/2021 11:12 AM EST -----  Subject: Appointment Request    Reason for Call: Routine Return from RN Triage    QUESTIONS  Type of Appointment? Established Patient  Reason for appointment request? Available appointments did not meet   patient need  Additional Information for Provider? Patient needs a followup from an ED   visit on Dec. 12th, needs to be [facility] or Friday. Chronic SOB since   the summer  ---------------------------------------------------------------------------  --------------  CALL BACK INFO  What is the best way for the office to contact you? OK to leave message on   voicemail  Preferred Call Back Phone Number? [phone]  ---------------------------------------------------------------------------  --------------  SCRIPT ANSWERS  Patient needs to be seen today? No  Patient needs to be seen today or tomorrow? No  Patient needs to be seen within 3 days? No  Patient needs to be seen within 5 days? No  Patient can be seen for a routine visit? Yes   Nurse Name? Dearl Abiodun  Have you been diagnosed with, awaiting test results for, or told that you   are suspected of having COVID-19 (Coronavirus)? (If patient has tested   negative or was tested as a requirement for work, school, or travel and   not based on symptoms, answer no)? No  Within the past two weeks have you developed any of the following symptoms   (answer no if symptoms have been present longer than 2 weeks or began   more than 2 weeks ago)? Fever or Chills, Cough, Shortness of breath or   difficulty breathing, Loss of taste or smell, Sore throat, Nasal   congestion, Sneezing or runny nose, Fatigue or generalized body aches   (answer no if pain is specific to a body part e.g. back pain), Diarrhea,   Headache? No  Have you had close contact with someone with COVID-19 in the last 14 days? No  (Service Expert  click yes below to proceed with [employer] As Usual   Scheduling)?  Yes

## 2022-01-03 ENCOUNTER — OFFICE VISIT (OUTPATIENT)
Dept: FAMILY MEDICINE CLINIC | Age: 82
End: 2022-01-03
Payer: MEDICARE

## 2022-01-03 ENCOUNTER — HOSPITAL ENCOUNTER (OUTPATIENT)
Age: 82
Discharge: HOME | End: 2022-01-03
Payer: MEDICARE

## 2022-01-03 VITALS
HEART RATE: 40 BPM | OXYGEN SATURATION: 96 % | DIASTOLIC BLOOD PRESSURE: 50 MMHG | TEMPERATURE: 97.2 F | SYSTOLIC BLOOD PRESSURE: 100 MMHG | RESPIRATION RATE: 18 BRPM | WEIGHT: 125 LBS | BODY MASS INDEX: 22.86 KG/M2

## 2022-01-03 DIAGNOSIS — J98.4 RESTRICTIVE LUNG DISEASE: ICD-10-CM

## 2022-01-03 DIAGNOSIS — I10 PRIMARY HYPERTENSION: ICD-10-CM

## 2022-01-03 DIAGNOSIS — R00.1 BRADYCARDIA: Primary | ICD-10-CM

## 2022-01-03 DIAGNOSIS — R53.1 GENERALIZED WEAKNESS: ICD-10-CM

## 2022-01-03 DIAGNOSIS — I48.0 PAROXYSMAL A-FIB (HCC): ICD-10-CM

## 2022-01-03 LAB
ANION GAP SERPL CALCULATED.3IONS-SCNC: 12 MMOL/L (ref 4–16)
BASOPHILS ABSOLUTE: 0 K/CU MM
BASOPHILS RELATIVE PERCENT: 0.2 % (ref 0–1)
BUN BLDV-MCNC: 18 MG/DL (ref 6–23)
CALCIUM SERPL-MCNC: 9.6 MG/DL (ref 8.3–10.6)
CHLORIDE BLD-SCNC: 92 MMOL/L (ref 99–110)
CO2: 40 MMOL/L (ref 21–32)
CREAT SERPL-MCNC: 0.5 MG/DL (ref 0.6–1.1)
DIFFERENTIAL TYPE: ABNORMAL
EOSINOPHILS ABSOLUTE: 0.1 K/CU MM
EOSINOPHILS RELATIVE PERCENT: 2.6 % (ref 0–3)
GFR AFRICAN AMERICAN: >60 ML/MIN/1.73M2
GFR NON-AFRICAN AMERICAN: >60 ML/MIN/1.73M2
GLUCOSE FASTING: 108 MG/DL (ref 70–99)
HCT VFR BLD CALC: 33.8 % (ref 37–47)
HEMOGLOBIN: 10.2 GM/DL (ref 12.5–16)
IMMATURE NEUTROPHIL %: 0.2 % (ref 0–0.43)
LYMPHOCYTES ABSOLUTE: 0.6 K/CU MM
LYMPHOCYTES RELATIVE PERCENT: 13.7 % (ref 24–44)
MCH RBC QN AUTO: 31 PG (ref 27–31)
MCHC RBC AUTO-ENTMCNC: 30.2 % (ref 32–36)
MCV RBC AUTO: 102.7 FL (ref 78–100)
MONOCYTES ABSOLUTE: 0.5 K/CU MM
MONOCYTES RELATIVE PERCENT: 10.5 % (ref 0–4)
PDW BLD-RTO: 12.1 % (ref 11.7–14.9)
PLATELET # BLD: 184 K/CU MM (ref 140–440)
PMV BLD AUTO: 9.2 FL (ref 7.5–11.1)
POTASSIUM SERPL-SCNC: 4.2 MMOL/L (ref 3.5–5.1)
RBC # BLD: 3.29 M/CU MM (ref 4.2–5.4)
SEGMENTED NEUTROPHILS ABSOLUTE COUNT: 3.1 K/CU MM
SEGMENTED NEUTROPHILS RELATIVE PERCENT: 72.8 % (ref 36–66)
SODIUM BLD-SCNC: 144 MMOL/L (ref 135–145)
T4 FREE: 1.28 NG/DL (ref 0.9–1.8)
TOTAL IMMATURE NEUTOROPHIL: 0.01 K/CU MM
TSH HIGH SENSITIVITY: 0.68 UIU/ML (ref 0.27–4.2)
WBC # BLD: 4.3 K/CU MM (ref 4–10.5)

## 2022-01-03 PROCEDURE — 84439 ASSAY OF FREE THYROXINE: CPT

## 2022-01-03 PROCEDURE — 1123F ACP DISCUSS/DSCN MKR DOCD: CPT | Performed by: NURSE PRACTITIONER

## 2022-01-03 PROCEDURE — 93000 ELECTROCARDIOGRAM COMPLETE: CPT | Performed by: NURSE PRACTITIONER

## 2022-01-03 PROCEDURE — 36415 COLL VENOUS BLD VENIPUNCTURE: CPT

## 2022-01-03 PROCEDURE — 80048 BASIC METABOLIC PNL TOTAL CA: CPT

## 2022-01-03 PROCEDURE — 99214 OFFICE O/P EST MOD 30 MIN: CPT | Performed by: NURSE PRACTITIONER

## 2022-01-03 PROCEDURE — G8399 PT W/DXA RESULTS DOCUMENT: HCPCS | Performed by: NURSE PRACTITIONER

## 2022-01-03 PROCEDURE — 4040F PNEUMOC VAC/ADMIN/RCVD: CPT | Performed by: NURSE PRACTITIONER

## 2022-01-03 PROCEDURE — G8420 CALC BMI NORM PARAMETERS: HCPCS | Performed by: NURSE PRACTITIONER

## 2022-01-03 PROCEDURE — G8427 DOCREV CUR MEDS BY ELIG CLIN: HCPCS | Performed by: NURSE PRACTITIONER

## 2022-01-03 PROCEDURE — 85025 COMPLETE CBC W/AUTO DIFF WBC: CPT

## 2022-01-03 PROCEDURE — 1090F PRES/ABSN URINE INCON ASSESS: CPT | Performed by: NURSE PRACTITIONER

## 2022-01-03 PROCEDURE — 84443 ASSAY THYROID STIM HORMONE: CPT

## 2022-01-03 PROCEDURE — G8484 FLU IMMUNIZE NO ADMIN: HCPCS | Performed by: NURSE PRACTITIONER

## 2022-01-03 PROCEDURE — 1036F TOBACCO NON-USER: CPT | Performed by: NURSE PRACTITIONER

## 2022-01-03 RX ORDER — ALBUTEROL SULFATE 90 UG/1
2 AEROSOL, METERED RESPIRATORY (INHALATION) 4 TIMES DAILY PRN
Qty: 3 EACH | Refills: 2 | Status: SHIPPED | OUTPATIENT
Start: 2022-01-03

## 2022-01-03 ASSESSMENT — PATIENT HEALTH QUESTIONNAIRE - PHQ9
SUM OF ALL RESPONSES TO PHQ QUESTIONS 1-9: 0
SUM OF ALL RESPONSES TO PHQ QUESTIONS 1-9: 0
2. FEELING DOWN, DEPRESSED OR HOPELESS: 0
1. LITTLE INTEREST OR PLEASURE IN DOING THINGS: 0
SUM OF ALL RESPONSES TO PHQ QUESTIONS 1-9: 0
SUM OF ALL RESPONSES TO PHQ QUESTIONS 1-9: 0
SUM OF ALL RESPONSES TO PHQ9 QUESTIONS 1 & 2: 0

## 2022-01-03 ASSESSMENT — ENCOUNTER SYMPTOMS
BACK PAIN: 1
SORE THROAT: 0
ABDOMINAL PAIN: 0
COUGH: 0
SINUS PAIN: 0
SINUS PRESSURE: 0
SHORTNESS OF BREATH: 1
VOICE CHANGE: 0
VOMITING: 0
TROUBLE SWALLOWING: 0
CHEST TIGHTNESS: 0
STRIDOR: 0
CONSTIPATION: 0
NAUSEA: 0
GASTROINTESTINAL NEGATIVE: 1
DIARRHEA: 0
RHINORRHEA: 0
WHEEZING: 0

## 2022-01-03 NOTE — PROGRESS NOTES
Subjective:      Chief Complaint   Patient presents with    Follow-up       HPI:  Kelly Schulte is a [de-identified] y.o. female who presents today for follow up of chronic medical conditions. A-Fib/HTN   She currently follows with Dr. Lisa Colindres, Cardiology. She is currently prescribed Sotalol 80 mg BID, Nifedinpine 30 mg in the evening, Lisinopril 40 mg daily, Lasix 40 mg daily. She was last seen on 12/09/2021, blood work and chest x-ray done on that date. She was started on nifedipine at that time. She is not anticoagulated due to increased fall risk. She is bradycardic in office today. She has taken Lasix 40 mg this morning but has not taken Sotalol or Lisinopril. She took her daily dose of nifedipine last evening. Denies chest pain, dizziness/lightheadedness, pre-syncope or syncope. She is fatigued and reports RODRIGUEZ and reports current symptoms are baseline. She has pitting edema bilateral LE; right is worse than the left. Anxiety  She has generalized anxiety and has taken Ativan for years.  Per [facility] Ativan was last filled 11/29/21 . Phil Corado does not take it everyday only when her anxiety is severe.      Restrictive Lung Disease  She also follows with Dr. Rasheeda Kim, Pulmonologist for restrictive lung disease. She is on chronic home O2, 4 liters nasal cannula. Recently diagnosed with SANDRA and needs to schedule another sleep study. Seasonal Allergies  She has seasonal allergies and takes Claritin as needed. Even with Claritin she is sneezing, has pressure in her ears and nasal congestion. She denies fever, chills, headaches, sore throat, cough, or shortness of breath.      Past Medical History:   Diagnosis Date    Anxiety     H/O chronic respiratory failure 01/21/2020    High risk medication use 01/21/2020    HTN (hypertension)     Mitral valve prolapse     SANDRA (obstructive sleep apnea)     Paroxysmal atrial fibrillation (HCC)     Peripheral neuropathy 01/21/2020    Restrictive lung disease chronic home O2 - 2 liters NC    Scoliosis         Social History     Tobacco Use    Smoking status: Never Smoker    Smokeless tobacco: Never Used   Substance Use Topics    Alcohol use: Yes     Comment: occaisonal wine        Review of Systems   Constitutional: Positive for fatigue. Negative for activity change, appetite change, chills, fever and unexpected weight change. HENT: Negative for congestion, ear pain, hearing loss, postnasal drip, rhinorrhea, sinus pressure, sinus pain, sneezing, sore throat, tinnitus, trouble swallowing and voice change. Eyes: Negative for visual disturbance. Respiratory: Positive for shortness of breath (RODRIGUEZ). Negative for cough, chest tightness, wheezing and stridor. Patient with restrictive lung disease - symptoms are baseline     Cardiovascular: Positive for leg swelling. Negative for chest pain and palpitations. Gastrointestinal: Negative. Negative for abdominal pain, constipation, diarrhea, nausea and vomiting. Endocrine: Negative. Musculoskeletal: Positive for back pain, gait problem and myalgias. Negative for arthralgias. Skin: Negative. Negative for rash. Neurological: Positive for weakness and numbness. Negative for dizziness, tremors, seizures, syncope, facial asymmetry, speech difficulty, light-headedness and headaches. Hematological: Negative for adenopathy. Psychiatric/Behavioral: Negative for behavioral problems, confusion, decreased concentration, dysphoric mood, sleep disturbance and suicidal ideas. The patient is not nervous/anxious and is not hyperactive. Objective:      BP (!) 100/50 (Site: Right Upper Arm, Position: Sitting, Cuff Size: Medium Adult)   Pulse (!) 40   Temp 97.2 °F (36.2 °C) (Temporal)   Resp 18   Wt 125 lb (56.7 kg)   SpO2 96%   BMI 22.86 kg/m²      Physical Exam  Vitals reviewed. Constitutional:       Appearance: Normal appearance.    HENT:      Right Ear: External ear normal.      Left Ear: External ear normal.      Mouth/Throat:      Mouth: Mucous membranes are moist.      Pharynx: Oropharynx is clear. Eyes:      Extraocular Movements: Extraocular movements intact. Conjunctiva/sclera: Conjunctivae normal.      Pupils: Pupils are equal, round, and reactive to light. Neck:      Thyroid: No thyroid mass, thyromegaly or thyroid tenderness. Vascular: No carotid bruit. Cardiovascular:      Rate and Rhythm: Regular rhythm. Bradycardia present. Pulses: Normal pulses. Heart sounds: Heart sounds are distant. Pulmonary:      Effort: Pulmonary effort is normal.      Breath sounds: Decreased breath sounds (throughout) present. No wheezing, rhonchi or rales. Abdominal:      General: Bowel sounds are normal.      Palpations: Abdomen is soft. There is no mass. Hernia: No hernia is present. Musculoskeletal:      Cervical back: Normal range of motion and neck supple. Right lower le+ Pitting Edema present. Left lower le+ Pitting Edema present. Skin:     General: Skin is warm and dry. Capillary Refill: Capillary refill takes less than 2 seconds. Neurological:      General: No focal deficit present. Mental Status: She is alert. Cranial Nerves: Cranial nerves are intact. Sensory: Sensory deficit (bilateral LE with decreased sensation) present. Motor: Weakness (RUE and bilateral LE) present. Gait: Gait abnormal (she is using a walker). Psychiatric:         Attention and Perception: Attention normal.         Mood and Affect: Mood and affect normal.         Behavior: Behavior normal. Behavior is cooperative. Cognition and Memory: Cognition and memory normal.            Assessment / Plan:      1. Bradycardia  Spoke to Quincy Treadwell at Dr. Kevin Strickland office. EKG faxed for his review. Will check labs. - EKG 12 lead  - Basic Metabolic Panel; Future  - CBC Auto Differential; Future  - T4, Free; Future  - TSH without Reflex; Future    2. Paroxysmal A-fib (HCC)  Continue ASA 81 mg daily. Continue Sotalol 80 mg BID. Follow up with cardiology as scheduled. 3. Primary hypertension  Will stop Procardia XL due to mild hypotension. Continue Lisinopril 40 mg daily and Lasix 40 daily. Monitor blood pressures and HR. Goal is 130/80 or less. Please call the office if pulse is less than 50 or greater than 279, systolic BP less than 90 or greater than 604, diastolic BP less than 50 or greater than 100   Increase your daily intake of grains, fresh fruits and vegetables. Reduce dietary sodium; less than 2.4 grams per day. 4. Restrictive lung disease  Follow up with pulmonologist.  Limit use of ativan and discussed starting low dose sertraline to help manage symptoms of anxiety; pt to consider.    - albuterol sulfate HFA (VENTOLIN HFA) 108 (90 Base) MCG/ACT inhaler; Inhale 2 puffs into the lungs 4 times daily as needed for Wheezing  Dispense: 3 each;  Refill: 2          JEREMY Tran - CNP

## 2022-01-31 ENCOUNTER — OFFICE VISIT (OUTPATIENT)
Dept: FAMILY MEDICINE CLINIC | Age: 82
End: 2022-01-31
Payer: MEDICARE

## 2022-01-31 VITALS
OXYGEN SATURATION: 98 % | HEART RATE: 68 BPM | WEIGHT: 131 LBS | TEMPERATURE: 98.1 F | BODY MASS INDEX: 23.96 KG/M2 | RESPIRATION RATE: 17 BRPM | DIASTOLIC BLOOD PRESSURE: 64 MMHG | SYSTOLIC BLOOD PRESSURE: 136 MMHG

## 2022-01-31 DIAGNOSIS — H60.502 ACUTE OTITIS EXTERNA OF LEFT EAR, UNSPECIFIED TYPE: Primary | ICD-10-CM

## 2022-01-31 DIAGNOSIS — H92.03 OTALGIA OF BOTH EARS: ICD-10-CM

## 2022-01-31 DIAGNOSIS — H61.23 BILATERAL IMPACTED CERUMEN: ICD-10-CM

## 2022-01-31 PROCEDURE — G8399 PT W/DXA RESULTS DOCUMENT: HCPCS | Performed by: NURSE PRACTITIONER

## 2022-01-31 PROCEDURE — 99213 OFFICE O/P EST LOW 20 MIN: CPT | Performed by: NURSE PRACTITIONER

## 2022-01-31 PROCEDURE — 69210 REMOVE IMPACTED EAR WAX UNI: CPT | Performed by: NURSE PRACTITIONER

## 2022-01-31 PROCEDURE — G8427 DOCREV CUR MEDS BY ELIG CLIN: HCPCS | Performed by: NURSE PRACTITIONER

## 2022-01-31 PROCEDURE — 1090F PRES/ABSN URINE INCON ASSESS: CPT | Performed by: NURSE PRACTITIONER

## 2022-01-31 PROCEDURE — G8420 CALC BMI NORM PARAMETERS: HCPCS | Performed by: NURSE PRACTITIONER

## 2022-01-31 PROCEDURE — G8484 FLU IMMUNIZE NO ADMIN: HCPCS | Performed by: NURSE PRACTITIONER

## 2022-01-31 PROCEDURE — 1123F ACP DISCUSS/DSCN MKR DOCD: CPT | Performed by: NURSE PRACTITIONER

## 2022-01-31 PROCEDURE — 1036F TOBACCO NON-USER: CPT | Performed by: NURSE PRACTITIONER

## 2022-01-31 PROCEDURE — 4130F TOPICAL PREP RX AOE: CPT | Performed by: NURSE PRACTITIONER

## 2022-01-31 PROCEDURE — 4040F PNEUMOC VAC/ADMIN/RCVD: CPT | Performed by: NURSE PRACTITIONER

## 2022-01-31 RX ORDER — OFLOXACIN 3 MG/ML
5 SOLUTION AURICULAR (OTIC) 2 TIMES DAILY
Qty: 3.5 ML | Refills: 0 | Status: SHIPPED | OUTPATIENT
Start: 2022-01-31 | End: 2022-02-07

## 2022-01-31 ASSESSMENT — ENCOUNTER SYMPTOMS
STRIDOR: 0
COUGH: 0
ABDOMINAL PAIN: 0
VOMITING: 0
CHEST TIGHTNESS: 0
SORE THROAT: 0
CONSTIPATION: 0
DIARRHEA: 0
WHEEZING: 0
SHORTNESS OF BREATH: 0
NAUSEA: 0

## 2022-01-31 ASSESSMENT — PATIENT HEALTH QUESTIONNAIRE - PHQ9
SUM OF ALL RESPONSES TO PHQ9 QUESTIONS 1 & 2: 0
SUM OF ALL RESPONSES TO PHQ QUESTIONS 1-9: 3
3. TROUBLE FALLING OR STAYING ASLEEP: 0
2. FEELING DOWN, DEPRESSED OR HOPELESS: 0
6. FEELING BAD ABOUT YOURSELF - OR THAT YOU ARE A FAILURE OR HAVE LET YOURSELF OR YOUR FAMILY DOWN: 0
SUM OF ALL RESPONSES TO PHQ QUESTIONS 1-9: 3
10. IF YOU CHECKED OFF ANY PROBLEMS, HOW DIFFICULT HAVE THESE PROBLEMS MADE IT FOR YOU TO DO YOUR WORK, TAKE CARE OF THINGS AT HOME, OR GET ALONG WITH OTHER PEOPLE: 0
9. THOUGHTS THAT YOU WOULD BE BETTER OFF DEAD, OR OF HURTING YOURSELF: 0
1. LITTLE INTEREST OR PLEASURE IN DOING THINGS: 0
8. MOVING OR SPEAKING SO SLOWLY THAT OTHER PEOPLE COULD HAVE NOTICED. OR THE OPPOSITE, BEING SO FIGETY OR RESTLESS THAT YOU HAVE BEEN MOVING AROUND A LOT MORE THAN USUAL: 0
SUM OF ALL RESPONSES TO PHQ QUESTIONS 1-9: 3
7. TROUBLE CONCENTRATING ON THINGS, SUCH AS READING THE NEWSPAPER OR WATCHING TELEVISION: 2
SUM OF ALL RESPONSES TO PHQ QUESTIONS 1-9: 3
5. POOR APPETITE OR OVEREATING: 0
4. FEELING TIRED OR HAVING LITTLE ENERGY: 1

## 2022-01-31 ASSESSMENT — ANXIETY QUESTIONNAIRES
2. NOT BEING ABLE TO STOP OR CONTROL WORRYING: 0
7. FEELING AFRAID AS IF SOMETHING AWFUL MIGHT HAPPEN: 0
IF YOU CHECKED OFF ANY PROBLEMS ON THIS QUESTIONNAIRE, HOW DIFFICULT HAVE THESE PROBLEMS MADE IT FOR YOU TO DO YOUR WORK, TAKE CARE OF THINGS AT HOME, OR GET ALONG WITH OTHER PEOPLE: NOT DIFFICULT AT ALL
GAD7 TOTAL SCORE: 1
6. BECOMING EASILY ANNOYED OR IRRITABLE: 1
5. BEING SO RESTLESS THAT IT IS HARD TO SIT STILL: 0
1. FEELING NERVOUS, ANXIOUS, OR ON EDGE: 0
4. TROUBLE RELAXING: 0
3. WORRYING TOO MUCH ABOUT DIFFERENT THINGS: 0

## 2022-01-31 NOTE — PATIENT INSTRUCTIONS

## 2022-01-31 NOTE — PROGRESS NOTES
Subjective:      Chief Complaint   Patient presents with    Other     Patient presents today for a follow up on her ears. HPI:  Loyda Araujo is a [de-identified] y.o. female who presents today for bilateral ear pain. she presents with history of  bilateral otalgia, left is worse than the right. Pt has been afebrile. No rhinorrhea, cough, vomiting or diarrhea. Activity and appetite have been normal.    Past Medical History:   Diagnosis Date    Anxiety     H/O chronic respiratory failure 01/21/2020    High risk medication use 01/21/2020    HTN (hypertension)     Mitral valve prolapse     SANDRA (obstructive sleep apnea)     Paroxysmal atrial fibrillation (HCC)     Peripheral neuropathy 01/21/2020    Restrictive lung disease     chronic home O2 - 2 liters NC    Scoliosis         Social History     Tobacco Use    Smoking status: Never Smoker    Smokeless tobacco: Never Used   Substance Use Topics    Alcohol use: Yes     Comment: occaisonal wine        Review of Systems   Constitutional: Negative for activity change, appetite change, chills, fatigue, fever and unexpected weight change. HENT: Positive for ear pain (bilateral). Negative for congestion, ear discharge, hearing loss, sore throat and tinnitus. Eyes: Negative for visual disturbance. Respiratory: Negative for cough, chest tightness, shortness of breath, wheezing and stridor. Cardiovascular: Negative for chest pain, palpitations and leg swelling. Gastrointestinal: Negative for abdominal pain, constipation, diarrhea, nausea and vomiting. Musculoskeletal: Negative for arthralgias and gait problem. Skin: Negative. Neurological: Negative for dizziness, tremors, seizures, syncope, speech difficulty, weakness and headaches. Hematological: Negative for adenopathy. Psychiatric/Behavioral: Negative for behavioral problems, confusion, sleep disturbance and suicidal ideas. The patient is not nervous/anxious.             Objective:      BP 136/64 (Site: Left Upper Arm, Position: Sitting, Cuff Size: Medium Adult)   Pulse 68   Temp 98.1 °F (36.7 °C) (Temporal)   Resp 17   Wt 131 lb (59.4 kg)   SpO2 98%   BMI 23.96 kg/m²      Physical Exam  Vitals reviewed. Constitutional:       General: She is not in acute distress. Appearance: Normal appearance. She is not ill-appearing. HENT:      Head: Normocephalic. Right Ear: Tympanic membrane, ear canal and external ear normal. There is impacted cerumen. Left Ear: Tympanic membrane and external ear normal. There is impacted cerumen. Ears:      Comments: External canal erythematous and swollen     Mouth/Throat:      Mouth: Mucous membranes are moist.      Pharynx: Oropharynx is clear. Eyes:      Extraocular Movements: Extraocular movements intact. Conjunctiva/sclera: Conjunctivae normal.      Pupils: Pupils are equal, round, and reactive to light. Neck:      Vascular: No carotid bruit. Cardiovascular:      Rate and Rhythm: Normal rate and regular rhythm. Heart sounds: Normal heart sounds. Pulmonary:      Effort: Pulmonary effort is normal.      Breath sounds: Normal breath sounds. Musculoskeletal:         General: Normal range of motion. Cervical back: Normal range of motion and neck supple. Right lower leg: No edema. Left lower leg: No edema. Skin:     General: Skin is warm and dry. Capillary Refill: Capillary refill takes less than 2 seconds. Neurological:      Mental Status: She is alert and oriented to person, place, and time. Assessment / Plan:      1. Otalgia of both ears    2. Bilateral impacted cerumen  Ceruminosis is noted. Wax is removed by irrigation and manual debridement. Patient tolerated well. Instructions for home care to prevent wax buildup are given. 3. Acute otitis externa of left ear, unspecified type  Return for new or worsening symptoms.     - ofloxacin (FLOXIN OTIC) 0.3 % otic solution; Place 5 drops into the left ear 2 times daily for 7 days  Dispense: 3.5 mL; Refill: 0          Kiya Ward, APRN - CNP

## 2022-04-22 ENCOUNTER — TELEPHONE (OUTPATIENT)
Dept: FAMILY MEDICINE CLINIC | Age: 82
End: 2022-04-22

## 2022-04-22 DIAGNOSIS — J30.9 ALLERGIC RHINITIS, UNSPECIFIED SEASONALITY, UNSPECIFIED TRIGGER: Primary | ICD-10-CM

## 2022-04-22 RX ORDER — FLUTICASONE PROPIONATE 50 MCG
1 SPRAY, SUSPENSION (ML) NASAL DAILY
Qty: 32 G | Refills: 1 | Status: SHIPPED | OUTPATIENT
Start: 2022-04-22

## 2022-04-25 ENCOUNTER — TELEPHONE (OUTPATIENT)
Dept: FAMILY MEDICINE CLINIC | Age: 82
End: 2022-04-25

## 2022-05-10 ENCOUNTER — OFFICE VISIT (OUTPATIENT)
Dept: FAMILY MEDICINE CLINIC | Age: 82
End: 2022-05-10

## 2022-05-10 VITALS
DIASTOLIC BLOOD PRESSURE: 74 MMHG | RESPIRATION RATE: 20 BRPM | TEMPERATURE: 97.2 F | OXYGEN SATURATION: 97 % | WEIGHT: 128.2 LBS | HEART RATE: 68 BPM | BODY MASS INDEX: 23.45 KG/M2 | SYSTOLIC BLOOD PRESSURE: 132 MMHG

## 2022-05-10 DIAGNOSIS — Z01.818 PREOP EXAMINATION: Primary | ICD-10-CM

## 2022-05-10 RX ORDER — NIFEDIPINE 30 MG/1
TABLET, EXTENDED RELEASE ORAL
COMMUNITY
Start: 2022-03-16

## 2022-05-10 RX ORDER — TORSEMIDE 20 MG/1
20 TABLET ORAL DAILY
COMMUNITY
End: 2022-10-27 | Stop reason: DRUGHIGH

## 2022-05-10 ASSESSMENT — PATIENT HEALTH QUESTIONNAIRE - PHQ9
SUM OF ALL RESPONSES TO PHQ QUESTIONS 1-9: 0
1. LITTLE INTEREST OR PLEASURE IN DOING THINGS: 0
2. FEELING DOWN, DEPRESSED OR HOPELESS: 0
SUM OF ALL RESPONSES TO PHQ QUESTIONS 1-9: 0
SUM OF ALL RESPONSES TO PHQ QUESTIONS 1-9: 0
SUM OF ALL RESPONSES TO PHQ9 QUESTIONS 1 & 2: 0
SUM OF ALL RESPONSES TO PHQ QUESTIONS 1-9: 0

## 2022-05-10 NOTE — PROGRESS NOTES
Subjective:      Sofia Bhatt is a 81 y.o. female who presents to the office today for a preoperative consultation at the request of surgeon Sol Or who plans on performing bilateral cataract surgery on May 19. This consultation is requested for the specific conditions prompting preoperative evaluation (i.e. because of potential affect on operative risk): oxygen dependent, mitral valve prolapse, lung disease, and Afib. Planned anesthesia is conscious sedation with Versed and Fentanyl. The patient has the following known anesthesia issues: unknown  Patient has a bleeding risk of : no recent abnormal bleeding  Patient does not have objection to receiving blood products if needed. Patient's medications, allergies, past medical, surgical, social and family histories were reviewed and updated as appropriate. Review of Systems  Pertinent items are noted in HPI. Objective:      /74 (Site: Left Upper Arm, Position: Sitting, Cuff Size: Medium Adult)   Pulse 68   Temp 97.2 °F (36.2 °C) (Infrared)   Resp 20   Wt 128 lb 3.2 oz (58.2 kg)   SpO2 97%   BMI 23.45 kg/m²     General Appearance:   On wheel chair and O2 dependent, Alert, cooperative, no distress, appears stated age   Head:  Normocephalic, without obvious abnormality, atraumatic   Eyes:  , conjunctiva/corneas clear, EOM's intact   Ears:  Normal TM's and external ear canals, both ears   Nose: Nares normal, septum midline,mucosa normal, no drainage or sinus tenderness   Throat: Lips, mucosa, and tongue normal; teeth and gums normal   Neck: Supple, symmetrical, trachea midline, no adenopathy;  thyroid: not enlarged, symmetric, no tenderness/mass/nodules; no carotid bruit or JVD   Back:   Severe scoliosis, no CVA tenderness   Lungs:   Clear to auscultation bilaterally, respirations unlabored       Heart:  Regular rate, no friction rub   Abdomen:   Soft, non-tender, bowel sounds active all four quadrants,  no masses, no organomegaly   Pelvic: Deferred Extremities: Extremities normal, atraumatic, no cyanosis, compression stockings in place                             Cardiographics  ECG: Most recent ECG on 01/03/2022  Echocardiogram: 09/20/2021    Imaging  Chest X-Ray: on 09/19/2020,  showed Severe thoracolumbar scoliosis and accentuated kyphotic curvature      Lab Review   No visits with results within 2 Month(s) from this visit. Latest known visit with results is:   Hospital Outpatient Visit on 01/03/2022   Component Date Value    Sodium 01/03/2022 144     Potassium 01/03/2022 4.2     Chloride 01/03/2022 92*    CO2 01/03/2022 40*    Anion Gap 01/03/2022 12     BUN 01/03/2022 18     CREATININE 01/03/2022 0.5*    Glucose, Fasting 01/03/2022 108*    Calcium 01/03/2022 9.6     GFR Non- 01/03/2022 >60     GFR  01/03/2022 >60     WBC 01/03/2022 4.3     RBC 01/03/2022 3.29*    Hemoglobin 01/03/2022 10.2*    Hematocrit 01/03/2022 33.8*    MCV 01/03/2022 102.7*    MCH 01/03/2022 31.0     MCHC 01/03/2022 30.2*    RDW 01/03/2022 12.1     Platelets 59/55/4225 184     MPV 01/03/2022 9.2     Differential Type 01/03/2022 AUTOMATED DIFFERENTIAL     Segs Relative 01/03/2022 72.8*    Lymphocytes % 01/03/2022 13.7*    Monocytes % 01/03/2022 10.5*    Eosinophils % 01/03/2022 2.6     Basophils % 01/03/2022 0.2     Segs Absolute 01/03/2022 3.1     Lymphocytes Absolute 01/03/2022 0.6     Monocytes Absolute 01/03/2022 0.5     Eosinophils Absolute 01/03/2022 0.1     Basophils Absolute 01/03/2022 0.0     Immature Neutrophil % 01/03/2022 0.2     Total Immature Neutrophil 01/03/2022 0.01     T4 Free 01/03/2022 1.28     TSH, High Sensitivity 01/03/2022 0.684          Assessment:        81 y.o. female with planned surgery as above.     Known risk factors for perioperative complications: None  Oxygen dependence, Cardiac arrythmia, and valvular heart disease              Plan:        Invasive hemodynamic monitoring perioperatively: not indicated   Deep vein thrombosis prophylaxis postoperatively:regimen to be chosen by surgical team  Surveillance for postoperative MI with ECG immediately postoperatively and on postoperative days 1 and 2 AND troponin levels 24 hours postoperatively and on day 4 or hospital discharge (whichever comes first): not indicated  May proceed with surgery,  Paperwork completed

## 2022-07-29 ENCOUNTER — OFFICE VISIT (OUTPATIENT)
Dept: FAMILY MEDICINE CLINIC | Age: 82
End: 2022-07-29
Payer: MEDICARE

## 2022-07-29 VITALS
WEIGHT: 127 LBS | OXYGEN SATURATION: 91 % | SYSTOLIC BLOOD PRESSURE: 154 MMHG | HEART RATE: 64 BPM | BODY MASS INDEX: 23.23 KG/M2 | TEMPERATURE: 97.9 F | RESPIRATION RATE: 18 BRPM | DIASTOLIC BLOOD PRESSURE: 85 MMHG

## 2022-07-29 DIAGNOSIS — N94.9 VAGINAL BURNING: ICD-10-CM

## 2022-07-29 DIAGNOSIS — L30.9 DERMATITIS: Primary | ICD-10-CM

## 2022-07-29 LAB
BILIRUBIN, POC: NEGATIVE
BLOOD URINE, POC: NEGATIVE
CLARITY, POC: NORMAL
COLOR, POC: NORMAL
GLUCOSE URINE, POC: NEGATIVE
KETONES, POC: NEGATIVE
LEUKOCYTE EST, POC: NEGATIVE
NITRITE, POC: NEGATIVE
PH, POC: 8.5
PROTEIN, POC: NEGATIVE
SPECIFIC GRAVITY, POC: 1.02
UROBILINOGEN, POC: NORMAL

## 2022-07-29 PROCEDURE — G8399 PT W/DXA RESULTS DOCUMENT: HCPCS | Performed by: NURSE PRACTITIONER

## 2022-07-29 PROCEDURE — 1090F PRES/ABSN URINE INCON ASSESS: CPT | Performed by: NURSE PRACTITIONER

## 2022-07-29 PROCEDURE — G8427 DOCREV CUR MEDS BY ELIG CLIN: HCPCS | Performed by: NURSE PRACTITIONER

## 2022-07-29 PROCEDURE — 99213 OFFICE O/P EST LOW 20 MIN: CPT | Performed by: NURSE PRACTITIONER

## 2022-07-29 PROCEDURE — 81002 URINALYSIS NONAUTO W/O SCOPE: CPT | Performed by: NURSE PRACTITIONER

## 2022-07-29 PROCEDURE — 1036F TOBACCO NON-USER: CPT | Performed by: NURSE PRACTITIONER

## 2022-07-29 PROCEDURE — G8420 CALC BMI NORM PARAMETERS: HCPCS | Performed by: NURSE PRACTITIONER

## 2022-07-29 PROCEDURE — 1123F ACP DISCUSS/DSCN MKR DOCD: CPT | Performed by: NURSE PRACTITIONER

## 2022-07-29 RX ORDER — CLOTRIMAZOLE 1 %
CREAM (GRAM) TOPICAL 2 TIMES DAILY
Qty: 1 EACH | Refills: 1 | Status: SHIPPED | OUTPATIENT
Start: 2022-07-29 | End: 2022-08-12

## 2022-07-29 ASSESSMENT — PATIENT HEALTH QUESTIONNAIRE - PHQ9
2. FEELING DOWN, DEPRESSED OR HOPELESS: 1
SUM OF ALL RESPONSES TO PHQ QUESTIONS 1-9: 2
SUM OF ALL RESPONSES TO PHQ9 QUESTIONS 1 & 2: 2
SUM OF ALL RESPONSES TO PHQ QUESTIONS 1-9: 2
1. LITTLE INTEREST OR PLEASURE IN DOING THINGS: 1
SUM OF ALL RESPONSES TO PHQ QUESTIONS 1-9: 2
SUM OF ALL RESPONSES TO PHQ QUESTIONS 1-9: 2

## 2022-07-29 NOTE — PROGRESS NOTES
Subjective:      Chief Complaint   Patient presents with    Tailbone Pain     States that the skin on the outside of her tailbone is painful x 1 week. Feels like the area is raw. Worried that it may end up turning into a sore    Other     C/o vaginal burning. Denies vaginal itching       HPI:  Gwen Deluca is a 81 y.o. female who presents today for the following:     Rash  [employer] c/o of red, painful area on her coccyx and perineum. She reports that her skin feels raw and burns after she urinates. She recently started wearing a pad due to urinary leakage and doesn't know if this is the cause. She denies dysuria, urinary frequency or hesitancy, hematuria or vaginal discharge. Past Medical History:   Diagnosis Date    Anxiety     H/O chronic respiratory failure 01/21/2020    High risk medication use 01/21/2020    HTN (hypertension)     Mitral valve prolapse     SANDRA (obstructive sleep apnea)     Paroxysmal atrial fibrillation (HCC)     Peripheral neuropathy 01/21/2020    Restrictive lung disease     chronic home O2 - 2 liters NC    Scoliosis         Social History     Tobacco Use    Smoking status: Never    Smokeless tobacco: Never   Substance Use Topics    Alcohol use: Yes     Comment: occaisonal wine        Review of Systems   Constitutional:  Negative for activity change, appetite change, chills, fatigue, fever and unexpected weight change. HENT:  Negative for congestion, ear pain, hearing loss, sore throat and tinnitus. Eyes:  Negative for visual disturbance. Respiratory:  Negative for cough, chest tightness, shortness of breath, wheezing and stridor. Cardiovascular:  Negative for chest pain, palpitations and leg swelling. Gastrointestinal:  Negative for abdominal pain, constipation, diarrhea, nausea and vomiting. Musculoskeletal:  Negative for arthralgias and gait problem. Skin:  Positive for rash.    Neurological:  Negative for dizziness, tremors, seizures, syncope, speech difficulty, weakness and headaches. Hematological:  Negative for adenopathy. Psychiatric/Behavioral:  Negative for behavioral problems, confusion, sleep disturbance and suicidal ideas. The patient is not nervous/anxious. Objective:      BP (!) 154/85 (Site: Right Upper Arm, Position: Sitting, Cuff Size: Medium Adult)   Pulse 64   Temp 97.9 °F (36.6 °C)   Resp 18   Wt 127 lb (57.6 kg)   SpO2 91%   BMI 23.23 kg/m²      Physical Exam  Vitals reviewed. Exam conducted with a chaperone present (Dylan Olivia MA). Constitutional:       General: She is not in acute distress. Appearance: Normal appearance. She is not ill-appearing. HENT:      Head: Normocephalic. Right Ear: External ear normal.      Left Ear: External ear normal.      Mouth/Throat:      Mouth: Mucous membranes are moist.      Pharynx: Oropharynx is clear. Eyes:      Extraocular Movements: Extraocular movements intact. Conjunctiva/sclera: Conjunctivae normal.      Pupils: Pupils are equal, round, and reactive to light. Neck:      Vascular: No carotid bruit. Cardiovascular:      Rate and Rhythm: Normal rate and regular rhythm. Heart sounds: Normal heart sounds. Pulmonary:      Effort: Pulmonary effort is normal.      Breath sounds: Normal breath sounds. Genitourinary:     Comments: Intense erythema of perineum. Musculoskeletal:         General: Normal range of motion. Cervical back: Normal range of motion and neck supple. Right lower leg: No edema. Left lower leg: No edema. Skin:     General: Skin is warm and dry. Capillary Refill: Capillary refill takes less than 2 seconds. Neurological:      Mental Status: She is alert and oriented to person, place, and time. Assessment / Plan:      1. Dermatitis  Keep the  area clean and dry, Use warm water and mild, gentle soap with plenty of water to rinse the area. Apply a barrier cream, like Desitin or Butt Paste.  Use antifungal cream as prescribed. - clotrimazole (LOTRIMIN AF) 1 % cream; Apply topically 2 times daily for 14 days Apply topically 2 times daily. Dispense: 1 each; Refill: 1    2.  Vaginal burning  UA normal.    - POCT Urinalysis no Micro        JEREMY Castellanos - NP

## 2022-08-02 ASSESSMENT — ENCOUNTER SYMPTOMS
SORE THROAT: 0
CHEST TIGHTNESS: 0
VOMITING: 0
DIARRHEA: 0
CONSTIPATION: 0
COUGH: 0
SHORTNESS OF BREATH: 0
WHEEZING: 0
ABDOMINAL PAIN: 0
STRIDOR: 0
NAUSEA: 0

## 2022-09-09 ENCOUNTER — OFFICE VISIT (OUTPATIENT)
Dept: FAMILY MEDICINE CLINIC | Age: 82
End: 2022-09-09
Payer: MEDICARE

## 2022-09-09 VITALS
SYSTOLIC BLOOD PRESSURE: 108 MMHG | RESPIRATION RATE: 18 BRPM | BODY MASS INDEX: 23.34 KG/M2 | WEIGHT: 126.8 LBS | DIASTOLIC BLOOD PRESSURE: 64 MMHG | HEART RATE: 77 BPM | HEIGHT: 62 IN | TEMPERATURE: 98.4 F | OXYGEN SATURATION: 95 %

## 2022-09-09 DIAGNOSIS — J98.4 RESTRICTIVE LUNG DISEASE: ICD-10-CM

## 2022-09-09 DIAGNOSIS — I48.0 PAROXYSMAL A-FIB (HCC): ICD-10-CM

## 2022-09-09 DIAGNOSIS — L89.151 PRESSURE ULCER OF COCCYGEAL REGION, STAGE 1: ICD-10-CM

## 2022-09-09 DIAGNOSIS — Z79.899 HIGH RISK MEDICATION USE: ICD-10-CM

## 2022-09-09 DIAGNOSIS — Z00.00 INITIAL MEDICARE ANNUAL WELLNESS VISIT: Primary | ICD-10-CM

## 2022-09-09 LAB
ANION GAP SERPL CALCULATED.3IONS-SCNC: 11 MMOL/L (ref 3–16)
BUN BLDV-MCNC: 16 MG/DL (ref 7–20)
CALCIUM SERPL-MCNC: 9.7 MG/DL (ref 8.3–10.6)
CHLORIDE BLD-SCNC: 93 MMOL/L (ref 99–110)
CO2: 38 MMOL/L (ref 21–32)
CREAT SERPL-MCNC: 0.7 MG/DL (ref 0.6–1.2)
GFR AFRICAN AMERICAN: >60
GFR NON-AFRICAN AMERICAN: >60
GLUCOSE BLD-MCNC: 97 MG/DL (ref 70–99)
MAGNESIUM: 2.5 MG/DL (ref 1.8–2.4)
POTASSIUM SERPL-SCNC: 4.5 MMOL/L (ref 3.5–5.1)
SODIUM BLD-SCNC: 142 MMOL/L (ref 136–145)

## 2022-09-09 PROCEDURE — G0438 PPPS, INITIAL VISIT: HCPCS | Performed by: NURSE PRACTITIONER

## 2022-09-09 PROCEDURE — 36415 COLL VENOUS BLD VENIPUNCTURE: CPT | Performed by: NURSE PRACTITIONER

## 2022-09-09 PROCEDURE — 1123F ACP DISCUSS/DSCN MKR DOCD: CPT | Performed by: NURSE PRACTITIONER

## 2022-09-09 ASSESSMENT — PATIENT HEALTH QUESTIONNAIRE - PHQ9
SUM OF ALL RESPONSES TO PHQ QUESTIONS 1-9: 13
10. IF YOU CHECKED OFF ANY PROBLEMS, HOW DIFFICULT HAVE THESE PROBLEMS MADE IT FOR YOU TO DO YOUR WORK, TAKE CARE OF THINGS AT HOME, OR GET ALONG WITH OTHER PEOPLE: 0
SUM OF ALL RESPONSES TO PHQ QUESTIONS 1-9: 13
9. THOUGHTS THAT YOU WOULD BE BETTER OFF DEAD, OR OF HURTING YOURSELF: 0
3. TROUBLE FALLING OR STAYING ASLEEP: 3
4. FEELING TIRED OR HAVING LITTLE ENERGY: 3
7. TROUBLE CONCENTRATING ON THINGS, SUCH AS READING THE NEWSPAPER OR WATCHING TELEVISION: 1
SUM OF ALL RESPONSES TO PHQ QUESTIONS 1-9: 13
6. FEELING BAD ABOUT YOURSELF - OR THAT YOU ARE A FAILURE OR HAVE LET YOURSELF OR YOUR FAMILY DOWN: 1
SUM OF ALL RESPONSES TO PHQ QUESTIONS 1-9: 13
5. POOR APPETITE OR OVEREATING: 3
8. MOVING OR SPEAKING SO SLOWLY THAT OTHER PEOPLE COULD HAVE NOTICED. OR THE OPPOSITE, BEING SO FIGETY OR RESTLESS THAT YOU HAVE BEEN MOVING AROUND A LOT MORE THAN USUAL: 1
1. LITTLE INTEREST OR PLEASURE IN DOING THINGS: 1
SUM OF ALL RESPONSES TO PHQ9 QUESTIONS 1 & 2: 1
2. FEELING DOWN, DEPRESSED OR HOPELESS: 0

## 2022-09-09 ASSESSMENT — LIFESTYLE VARIABLES
HOW OFTEN DO YOU HAVE A DRINK CONTAINING ALCOHOL: MONTHLY OR LESS
HOW MANY STANDARD DRINKS CONTAINING ALCOHOL DO YOU HAVE ON A TYPICAL DAY: 1 OR 2

## 2022-09-09 NOTE — PROGRESS NOTES
Medicare Annual Wellness Visit    Brenna Stevens is here for Medicare AWV (Patient presents today for a [address] )    Assessment & Plan   Initial Medicare annual wellness visit  Continue efforts at regular exercise, healthy diet and adequate sleep. Paroxysmal A-fib ([address]  Follow up with cardiology as scheduled. -     Basic Metabolic Panel  -     Magnesium    High risk medication use  -     Basic Metabolic Panel  -     Magnesium    Pressure ulcer of coccygeal region, stage 1  Frequent position changes recommended. Mepilex ordered. If no improvement, will refer to the wound center.   -     Wound Dressings ([facility]) PADS; Apply to affected area, Disp-10 each, R-2Normal    Restrictive lung disease  Follow up with Pulmonology as scheduled. Recommendations for Preventive Services Due: see orders and patient instructions/AVS.  Recommended screening schedule for the next 5-10 years is provided to the patient in written form: see Patient Instructions/AVS.        Return in about 3 months (around 12/9/2022) for General.     Subjective   The following acute and/or chronic problems were also addressed today:    Humberto Peace has hx of paroxysmal AFib on Sotalol, hypertension, and a history of mitral valve prolapse, mild to mod AI and moderate dilated aorta. Cardiology ordered lab work which she would like to have drawn here. She has marked severe scoliosis with restrictive lung disease and chronic respiratory failure on 3 to 4 L O2. Also, dx'd w/ severe SANDRA and has CPAP machine. She is following up with a new pulmonologist later this month. She is concerned that she may have developed a wound on coccyx. She has been applying antifungal ointment with no improvement in symptoms . She denies fever or chills. Patient's complete Health Risk Assessment and screening values have been reviewed and are found in Flowsheets.  The following problems were reviewed today and where indicated follow up appointments were made and/or referrals ordered.     Positive Risk Factor Screenings with Interventions:    Fall Risk:  Do you feel unsteady or are you worried about falling? : (!) yes  2 or more falls in past year?: no  Fall with injury in past year?: no   Fall Risk Interventions:    Home safety tips provided  Patient declines any further evaluation/treatment for this issue     Depression:  PHQ-2 Score: 1  PHQ-9 Total Score: 13    Severity:1-4 = minimal depression, 5-9 = mild depression, 10-14 = moderate depression, 15-19 = moderately severe depression, 20-27 = severe depression  Depression Interventions:  LPN INTERVENTION GUIDE: SCORE 15 OR ABOVE = MODERATE TO SEVERE DEPRESSION: PCP CONSULTED DURING VISIT  Patient declines any further evaluation/treatment for this issue          General Health and ACP:  General  In general, how would you say your health is?: Good  In the past 7 days, have you experienced any of the following: New or Increased Pain, New or Increased Fatigue, Loneliness, Social Isolation, Stress or Anger?: (!) Yes  Select all that apply: (!) New or Increased Pain, New or Increased Fatigue, Loneliness  Do you get the social and emotional support that you need?: Yes  Do you have a Living Will?: Yes    Advance Directives       Power of  Living Will ACP-Advance Directive ACP-Power of     Not on File Not on File Not on File Not on File        General Health Risk Interventions:  Fatigue: patient declines any further evaluation/treatment for this issue       ADLs:  In the past 7 days, did you need help from others to perform any of the following everyday activities: Eating, dressing, grooming, bathing, toileting, or walking/balance?: No  In the past 7 days, did you need help from others to take care of any of the following: Laundry, housekeeping, banking/finances, shopping, telephone use, food preparation, transportation, or taking medications?: (!) Yes  Select all that apply: [place], Hydrochlorothiazide      unknown    Hydrochlorothiazide W-Amiloride      Other reaction(s): Other: See Comments  nausea, leg cramps      Iodine      unknown    Meloxicam     Tramadol      Other reaction(s): Other: See Comments  \"feels weird\"       Prior to Visit Medications    Medication Sig Taking? Authorizing Provider   Wound Dressings (MEPILEX BORDER FLEX LITE) PADS Apply to affected area Yes JEREMY Sharma NP   torsemide (DEMADEX) 20 MG tablet Take 20 mg by mouth daily Yes Historical Provider, MD   NIFEdipine (PROCARDIA XL) 30 MG extended release tablet  Yes Historical Provider, MD   fluticasone (FLONASE) 50 MCG/ACT nasal spray 1 spray by Each Nostril route daily Yes JEREMY Sharma NP   albuterol sulfate HFA (VENTOLIN HFA) 108 (90 Base) MCG/ACT inhaler Inhale 2 puffs into the lungs 4 times daily as needed for Wheezing Yes JEREMY Jiménez NP   sotalol (BETAPACE) 80 MG tablet Take 1 tablet by mouth 2 times daily Yes JEREMY Jiménez NP   methocarbamol (ROBAXIN) 500 MG tablet Take 750 mg by mouth as needed Yes Historical Provider, MD   UNABLE TO FIND Stationary Oxygen Concentrator at 2 lpm continuous via Nasal Cannula  Portable Gaseous O2 System and contents at 2 lpm via Nasal Cannula  Diagnosis: Restrictive lung disease. At rest patient's oxygen saturation on room air was 83%. With 2 lpm via nasal cannula, oxygen saturation 94% Yes JEREMY Sharma NP   aspirin 81 MG EC tablet Take 81 mg by mouth daily Yes Historical Provider, MD   loratadine (CLARITIN) 10 MG tablet Take 10 mg by mouth as needed Yes Historical Provider, MD   pramipexole (MIRAPEX) 0.5 MG tablet Take 2 tablets by mouth 3 times daily  Patient taking differently: Take 0.5 mg by mouth at bedtime Yes JEREMY Sharma NP   B Complex Vitamins (VITAMIN B COMPLEX) TABS Take 1 tablet by mouth 3 times daily Yes Historical Provider, MD   gabapentin (NEURONTIN) 100 MG capsule Take 100 mg by mouth daily as needed. Taking as needed Yes Historical Provider, MD   lisinopril (PRINIVIL;ZESTRIL) 40 MG tablet Take 40 mg by mouth daily Yes Historical Provider, MD   Magnesium Oxide 500 MG CAPS Take 500 mg by mouth daily Yes Historical Provider, MD   acetaminophen (TYLENOL) 500 MG tablet acetaminophen TYLENOL EXTRA STRENGTH 500 MG TABS 2 po Q4H prn 2011/05/11 ACETAMINOPHEN [id number] Dagmar Aase 05-  [facility] ([id number]) Yes Historical Provider, MD   lansoprazole (PREVACID) 15 MG delayed release capsule Taking as needed Yes Historical Provider, MD   furosemide (LASIX) 40 MG tablet TAKE 1 TABLET EVERY DAY  Patient not taking: No sig reported  Historical Provider, MD   GENERIC EXTERNAL MEDICATION Take 1 Dose by mouth daily  Patient not taking: No sig reported  Historical Provider, MD Valenzuela (Including outside providers/suppliers regularly involved in providing care):   Patient Care Team:  JEREMY Abdul NP as PCP - General (Nurse Practitioner)  JEREMY Abdul NP as PCP - [facility] Brigitte Vargasled Provider     Reviewed and updated this visit:  Tobacco  Allergies  Meds  Problems  Med Hx  Surg Hx  Soc Hx  Fam Hx

## 2022-09-09 NOTE — PATIENT INSTRUCTIONS
Personalized Preventive Plan for Livan Mercer - 9/9/2022  Medicare offers a range of preventive health benefits. Some of the tests and screenings are paid in full while other may be subject to a deductible, co-insurance, and/or copay. Some of these benefits include a comprehensive review of your medical history including lifestyle, illnesses that may run in your family, and various assessments and screenings as appropriate. After reviewing your medical record and screening and assessments performed today your provider may have ordered immunizations, labs, imaging, and/or referrals for you. A list of these orders (if applicable) as well as your Preventive Care list are included within your After Visit Summary for your review. Other Preventive Recommendations:    A preventive eye exam performed by an eye specialist is recommended every 1-2 years to screen for glaucoma; cataracts, macular degeneration, and other eye disorders. A preventive dental visit is recommended every 6 months. Try to get at least 150 minutes of exercise per week or 10,000 steps per day on a pedometer . Order or download the FREE \"Exercise & Physical Activity: Your Everyday Guide\" from [employer]. Call 4-842.881.4509 or search [employer] online. You need 5415-0286 mg of calcium and 7707-4268 IU of vitamin D per day. It is possible to meet your calcium requirement with diet alone, but a vitamin D supplement is usually necessary to meet this goal.  When exposed to the sun, use a sunscreen that protects against both UVA and UVB radiation with an SPF of 30 or greater. Reapply every 2 to 3 hours or after sweating, drying off with a towel, or swimming. Always wear a seat belt when traveling in a car. Always wear a helmet when riding a bicycle or motorcycle.

## 2022-10-27 ENCOUNTER — HOSPITAL ENCOUNTER (OUTPATIENT)
Age: 82
Discharge: HOME | End: 2022-10-27
Payer: MEDICARE

## 2022-10-27 ENCOUNTER — OFFICE VISIT (OUTPATIENT)
Dept: FAMILY MEDICINE CLINIC | Age: 82
End: 2022-10-27
Payer: MEDICARE

## 2022-10-27 ENCOUNTER — HOSPITAL ENCOUNTER (OUTPATIENT)
Dept: GENERAL RADIOLOGY | Age: 82
Discharge: HOME | End: 2022-10-27
Payer: MEDICARE

## 2022-10-27 VITALS
HEART RATE: 62 BPM | RESPIRATION RATE: 16 BRPM | BODY MASS INDEX: 24.36 KG/M2 | DIASTOLIC BLOOD PRESSURE: 84 MMHG | OXYGEN SATURATION: 95 % | TEMPERATURE: 97.4 F | SYSTOLIC BLOOD PRESSURE: 156 MMHG | WEIGHT: 133.2 LBS

## 2022-10-27 DIAGNOSIS — R60.0 LOCALIZED EDEMA: ICD-10-CM

## 2022-10-27 DIAGNOSIS — R06.02 SHORTNESS OF BREATH: Primary | ICD-10-CM

## 2022-10-27 DIAGNOSIS — R05.1 ACUTE COUGH: ICD-10-CM

## 2022-10-27 LAB
ALBUMIN SERPL-MCNC: 3.9 GM/DL (ref 3.4–5)
ALP BLD-CCNC: 78 IU/L (ref 40–129)
ALT SERPL-CCNC: 9 U/L (ref 10–40)
ANION GAP SERPL CALCULATED.3IONS-SCNC: 7 MMOL/L (ref 4–16)
AST SERPL-CCNC: 21 IU/L (ref 15–37)
BASOPHILS ABSOLUTE: 0 K/CU MM
BASOPHILS RELATIVE PERCENT: 0.2 % (ref 0–1)
BILIRUB SERPL-MCNC: 0.3 MG/DL (ref 0–1)
BUN BLDV-MCNC: 17 MG/DL (ref 6–23)
CALCIUM SERPL-MCNC: 9.3 MG/DL (ref 8.3–10.6)
CHLORIDE BLD-SCNC: 93 MMOL/L (ref 99–110)
CO2: 38 MMOL/L (ref 21–32)
CREAT SERPL-MCNC: 0.4 MG/DL (ref 0.6–1.1)
DIFFERENTIAL TYPE: ABNORMAL
EOSINOPHILS ABSOLUTE: 0.1 K/CU MM
EOSINOPHILS RELATIVE PERCENT: 1.7 % (ref 0–3)
GFR SERPL CREATININE-BSD FRML MDRD: >60 ML/MIN/1.73M2
GLUCOSE FASTING: 84 MG/DL (ref 70–99)
HCT VFR BLD CALC: 33.6 % (ref 37–47)
HEMOGLOBIN: 10.5 GM/DL (ref 12.5–16)
IMMATURE NEUTROPHIL %: 0.2 % (ref 0–0.43)
LYMPHOCYTES ABSOLUTE: 0.8 K/CU MM
LYMPHOCYTES RELATIVE PERCENT: 19.5 % (ref 24–44)
MCH RBC QN AUTO: 30.5 PG (ref 27–31)
MCHC RBC AUTO-ENTMCNC: 31.3 % (ref 32–36)
MCV RBC AUTO: 97.7 FL (ref 78–100)
MONOCYTES ABSOLUTE: 0.3 K/CU MM
MONOCYTES RELATIVE PERCENT: 7.6 % (ref 0–4)
PDW BLD-RTO: 12.7 % (ref 11.7–14.9)
PLATELET # BLD: 156 K/CU MM (ref 140–440)
PMV BLD AUTO: 9.3 FL (ref 7.5–11.1)
POTASSIUM SERPL-SCNC: 4.2 MMOL/L (ref 3.5–5.1)
PRO-BNP: 161 PG/ML
RBC # BLD: 3.44 M/CU MM (ref 4.2–5.4)
SEGMENTED NEUTROPHILS ABSOLUTE COUNT: 2.9 K/CU MM
SEGMENTED NEUTROPHILS RELATIVE PERCENT: 70.8 % (ref 36–66)
SODIUM BLD-SCNC: 138 MMOL/L (ref 135–145)
TOTAL IMMATURE NEUTOROPHIL: 0.01 K/CU MM
TOTAL PROTEIN: 6.5 GM/DL (ref 6.4–8.2)
WBC # BLD: 4.1 K/CU MM (ref 4–10.5)

## 2022-10-27 PROCEDURE — 1036F TOBACCO NON-USER: CPT | Performed by: NURSE PRACTITIONER

## 2022-10-27 PROCEDURE — G8399 PT W/DXA RESULTS DOCUMENT: HCPCS | Performed by: NURSE PRACTITIONER

## 2022-10-27 PROCEDURE — G8420 CALC BMI NORM PARAMETERS: HCPCS | Performed by: NURSE PRACTITIONER

## 2022-10-27 PROCEDURE — 3078F DIAST BP <80 MM HG: CPT | Performed by: NURSE PRACTITIONER

## 2022-10-27 PROCEDURE — 3074F SYST BP LT 130 MM HG: CPT | Performed by: NURSE PRACTITIONER

## 2022-10-27 PROCEDURE — 1090F PRES/ABSN URINE INCON ASSESS: CPT | Performed by: NURSE PRACTITIONER

## 2022-10-27 PROCEDURE — G8427 DOCREV CUR MEDS BY ELIG CLIN: HCPCS | Performed by: NURSE PRACTITIONER

## 2022-10-27 PROCEDURE — G8484 FLU IMMUNIZE NO ADMIN: HCPCS | Performed by: NURSE PRACTITIONER

## 2022-10-27 PROCEDURE — 36415 COLL VENOUS BLD VENIPUNCTURE: CPT

## 2022-10-27 PROCEDURE — 83880 ASSAY OF NATRIURETIC PEPTIDE: CPT

## 2022-10-27 PROCEDURE — 71046 X-RAY EXAM CHEST 2 VIEWS: CPT

## 2022-10-27 PROCEDURE — 85025 COMPLETE CBC W/AUTO DIFF WBC: CPT

## 2022-10-27 PROCEDURE — 93000 ELECTROCARDIOGRAM COMPLETE: CPT | Performed by: NURSE PRACTITIONER

## 2022-10-27 PROCEDURE — 1123F ACP DISCUSS/DSCN MKR DOCD: CPT | Performed by: NURSE PRACTITIONER

## 2022-10-27 PROCEDURE — 80053 COMPREHEN METABOLIC PANEL: CPT

## 2022-10-27 PROCEDURE — 99214 OFFICE O/P EST MOD 30 MIN: CPT | Performed by: NURSE PRACTITIONER

## 2022-10-27 NOTE — PROGRESS NOTES
Subjective:      Chief Complaint   Patient presents with    Swelling     Noticeable swelling in legs but feels swollen all over, fatigue, SOB on 3 liters O2, nausea, seen cardiologist 9/30/22 a new  at [facility]    Other     Patient doesn't think she took her night time pills       HPI:  Orlando Arce is a 81 y.o. female who presents today for the following:     Shortness of Breath/Edema  Hx of a-fib. She follows with cardiology and was last seen 09/30/2022. She is on 3 liters continuous oxygen. She has been feeling more short of breath with exertion over the last month with increased swelling in both legs. Her home oxygen saturation has remained greater than 90% on prescribed oxygen. She is currently prescribed Torsemide 20 mg daily. She denies chest pain/tightness, palpitations or irregular heartbeat. Past Medical History:   Diagnosis Date    Anxiety     H/O chronic respiratory failure 01/21/2020    High risk medication use 01/21/2020    HTN (hypertension)     Mitral valve prolapse     SANDRA (obstructive sleep apnea)     Paroxysmal atrial fibrillation (HCC)     Peripheral neuropathy 01/21/2020    Restrictive lung disease     chronic home O2 - 2 liters NC    Scoliosis         Social History     Tobacco Use    Smoking status: Never    Smokeless tobacco: Never   Substance Use Topics    Alcohol use: Yes     Comment: occaisonal wine        Review of Systems   Constitutional:  Positive for fatigue. Negative for activity change, appetite change, chills, fever and unexpected weight change. HENT:  Negative for congestion, ear pain, hearing loss, sore throat and tinnitus. Eyes:  Negative for visual disturbance. Respiratory:  Positive for cough and shortness of breath. Negative for chest tightness, wheezing and stridor. Cardiovascular:  Positive for leg swelling. Negative for chest pain and palpitations. Gastrointestinal:  Negative for abdominal pain, constipation, diarrhea, nausea and vomiting. Musculoskeletal:  Negative for arthralgias and gait problem. Skin: Negative. Neurological:  Negative for dizziness, tremors, seizures, syncope, speech difficulty, weakness and headaches. Hematological:  Negative for adenopathy. Psychiatric/Behavioral:  Negative for behavioral problems, confusion, sleep disturbance and suicidal ideas. The patient is not nervous/anxious. Objective:      BP (!) 156/84 (Site: Right Upper Arm, Position: Sitting, Cuff Size: Medium Adult)   Pulse 62   Temp 97.4 °F (36.3 °C) (Temporal)   Resp 16   Wt 133 lb 3.2 oz (60.4 kg)   SpO2 95%   BMI 24.36 kg/m²      Physical Exam  Vitals reviewed. Constitutional:       General: She is not in acute distress. Appearance: Normal appearance. She is not ill-appearing. HENT:      Head: Normocephalic. Right Ear: External ear normal.      Left Ear: External ear normal.      Mouth/Throat:      Mouth: Mucous membranes are moist.      Pharynx: Oropharynx is clear. Eyes:      Extraocular Movements: Extraocular movements intact. Conjunctiva/sclera: Conjunctivae normal.      Pupils: Pupils are equal, round, and reactive to light. Neck:      Vascular: No carotid bruit. Cardiovascular:      Rate and Rhythm: Normal rate and regular rhythm. Heart sounds: Normal heart sounds. Pulmonary:      Effort: Pulmonary effort is normal.      Breath sounds: Decreased air movement (thoughout) present. Examination of the left-lower field reveals rales. Rales present. Musculoskeletal:         General: Normal range of motion. Cervical back: Normal range of motion and neck supple. Right lower le+ Edema present. Left lower le+ Edema present. Skin:     General: Skin is warm and dry. Capillary Refill: Capillary refill takes less than 2 seconds. Neurological:      Mental Status: She is alert and oriented to person, place, and time. Assessment / Plan:      1.  Shortness of breath  EKG shows NSR.  Will check labs and chest x-ray. Patient instructed to go to ER for the following symptoms: syncope, confusion, severe headache, sudden changes in your vision, severe chest pain or pressure, chest pain that radiates to your jaw or in your arm,  shortness of breath, unilateral weakness or numbness, seizure activity. - EKG 12 lead  - CBC with Auto Differential  - Comprehensive Metabolic Panel  - XR CHEST (2 VW)  - Brain Natriuretic Peptide    2. Acute cough  - XR CHEST (2 VW)    3. Localized edema  Will increase Torsemide to 40 mg daily. Wear compression stockings daily. These stockings are tighter at the feet than on the legs. They may reduce pain and swelling in your legs. When you sit, use a pillow to raise the affected extremity. Try to keep it above the level of your heart. The patient,Heidi Wilkerson,  was seen with a total time spent of 30 minutes for the visit on this date of service by the E/M provider. The time component had both face to face and non face to face time spent in determining the total time component. Counseling and education regarding diagnosis listed and options regarding those diagnoses were also included in determining the time component.          JEREMY Stuart NP

## 2022-11-03 ASSESSMENT — ENCOUNTER SYMPTOMS
ABDOMINAL PAIN: 0
CHEST TIGHTNESS: 0
STRIDOR: 0
VOMITING: 0
CONSTIPATION: 0
SORE THROAT: 0
SHORTNESS OF BREATH: 1
NAUSEA: 0
DIARRHEA: 0
COUGH: 1
WHEEZING: 0

## 2022-11-18 ENCOUNTER — OFFICE VISIT (OUTPATIENT)
Dept: FAMILY MEDICINE CLINIC | Age: 82
End: 2022-11-18
Payer: MEDICARE

## 2022-11-18 VITALS
OXYGEN SATURATION: 97 % | BODY MASS INDEX: 23.7 KG/M2 | WEIGHT: 129.6 LBS | DIASTOLIC BLOOD PRESSURE: 68 MMHG | HEART RATE: 68 BPM | SYSTOLIC BLOOD PRESSURE: 118 MMHG | RESPIRATION RATE: 16 BRPM

## 2022-11-18 DIAGNOSIS — R60.0 LOCALIZED EDEMA: Primary | ICD-10-CM

## 2022-11-18 PROCEDURE — 1090F PRES/ABSN URINE INCON ASSESS: CPT | Performed by: NURSE PRACTITIONER

## 2022-11-18 PROCEDURE — G8420 CALC BMI NORM PARAMETERS: HCPCS | Performed by: NURSE PRACTITIONER

## 2022-11-18 PROCEDURE — 36415 COLL VENOUS BLD VENIPUNCTURE: CPT | Performed by: NURSE PRACTITIONER

## 2022-11-18 PROCEDURE — G8484 FLU IMMUNIZE NO ADMIN: HCPCS | Performed by: NURSE PRACTITIONER

## 2022-11-18 PROCEDURE — G8399 PT W/DXA RESULTS DOCUMENT: HCPCS | Performed by: NURSE PRACTITIONER

## 2022-11-18 PROCEDURE — 99213 OFFICE O/P EST LOW 20 MIN: CPT | Performed by: NURSE PRACTITIONER

## 2022-11-18 PROCEDURE — 3074F SYST BP LT 130 MM HG: CPT | Performed by: NURSE PRACTITIONER

## 2022-11-18 PROCEDURE — 1123F ACP DISCUSS/DSCN MKR DOCD: CPT | Performed by: NURSE PRACTITIONER

## 2022-11-18 PROCEDURE — 3078F DIAST BP <80 MM HG: CPT | Performed by: NURSE PRACTITIONER

## 2022-11-18 PROCEDURE — G8427 DOCREV CUR MEDS BY ELIG CLIN: HCPCS | Performed by: NURSE PRACTITIONER

## 2022-11-18 PROCEDURE — 1036F TOBACCO NON-USER: CPT | Performed by: NURSE PRACTITIONER

## 2022-11-18 NOTE — PROGRESS NOTES
Subjective:      Chief Complaint   Patient presents with    Follow-up     3 wk follow up, swelling has been good the past couple days       HPI:  Tierra Maxwell is a 81 y.o. female who presents today for the following:     Edema  Patient complains of chronic edema in both ankles and feet. Symptoms have improved with Torsemide dose increase. Her BP has been stable at home. She denies SE. She continues to report dyspnea with exertion but it is at baseline. Past Medical History:   Diagnosis Date    Anxiety     H/O chronic respiratory failure 01/21/2020    High risk medication use 01/21/2020    HTN (hypertension)     Mitral valve prolapse     SANDRA (obstructive sleep apnea)     Paroxysmal atrial fibrillation (HCC)     Peripheral neuropathy 01/21/2020    Restrictive lung disease     chronic home O2 - 2 liters NC    Scoliosis         Social History     Tobacco Use    Smoking status: Never    Smokeless tobacco: Never   Substance Use Topics    Alcohol use: Yes     Comment: occaisonal wine        Review of Systems   Constitutional:  Positive for fatigue. Negative for activity change, appetite change, chills, fever and unexpected weight change. HENT:  Negative for congestion, ear pain, hearing loss, sore throat and tinnitus. Eyes:  Negative for visual disturbance. Respiratory:  Positive for shortness of breath (symptoms are baseline). Negative for cough, chest tightness, wheezing and stridor. Cardiovascular:  Negative for chest pain, palpitations and leg swelling. Gastrointestinal:  Negative for abdominal pain, constipation, diarrhea, nausea and vomiting. Musculoskeletal:  Negative for arthralgias and gait problem. Skin: Negative. Neurological:  Negative for dizziness, tremors, seizures, syncope, speech difficulty, weakness and headaches. Hematological:  Negative for adenopathy. Psychiatric/Behavioral:  Negative for behavioral problems, confusion, sleep disturbance and suicidal ideas.  The patient is not nervous/anxious. Objective:      /68 (Site: Left Upper Arm, Position: Sitting, Cuff Size: Medium Adult)   Pulse 68   Resp 16   Wt 129 lb 9.6 oz (58.8 kg)   SpO2 97%   BMI 23.70 kg/m²      Physical Exam  Vitals reviewed. Constitutional:       General: She is not in acute distress. Appearance: Normal appearance. She is not ill-appearing. HENT:      Head: Normocephalic. Right Ear: External ear normal.      Left Ear: External ear normal.      Mouth/Throat:      Mouth: Mucous membranes are moist.      Pharynx: Oropharynx is clear. Eyes:      Extraocular Movements: Extraocular movements intact. Conjunctiva/sclera: Conjunctivae normal.      Pupils: Pupils are equal, round, and reactive to light. Neck:      Vascular: No carotid bruit. Cardiovascular:      Rate and Rhythm: Normal rate and regular rhythm. Heart sounds: Normal heart sounds. Comments: Pt wearing compression stockings  Pulmonary:      Effort: Pulmonary effort is normal.      Breath sounds: Decreased air movement (thoughout) present. No wheezing, rhonchi or rales. Musculoskeletal:         General: Normal range of motion. Cervical back: Normal range of motion and neck supple. Right lower leg: No edema. Left lower leg: No edema. Skin:     General: Skin is warm and dry. Capillary Refill: Capillary refill takes less than 2 seconds. Neurological:      Mental Status: She is alert and oriented to person, place, and time. Assessment / Plan:      1. Localized edema  Wear compression stockings daily. These stockings are tighter at the feet than on the legs. They may reduce pain and swelling in your legs. When you sit, use a pillow to raise the affected extremity. Try to keep it above the level of your heart. - Torsemide 40 MG TABS; Take 1 tablet by mouth daily  Dispense: 30 tablet;  Refill: 11  - Comprehensive Metabolic Panel        JEREMY Polanco NP

## 2022-11-19 LAB
A/G RATIO: 1.7 (ref 1.1–2.2)
ALBUMIN SERPL-MCNC: 4.2 G/DL (ref 3.4–5)
ALP BLD-CCNC: 88 U/L (ref 40–129)
ALT SERPL-CCNC: 11 U/L (ref 10–40)
ANION GAP SERPL CALCULATED.3IONS-SCNC: 6 MMOL/L (ref 3–16)
AST SERPL-CCNC: 21 U/L (ref 15–37)
BILIRUB SERPL-MCNC: <0.2 MG/DL (ref 0–1)
BUN BLDV-MCNC: 18 MG/DL (ref 7–20)
CALCIUM SERPL-MCNC: 9.6 MG/DL (ref 8.3–10.6)
CHLORIDE BLD-SCNC: 93 MMOL/L (ref 99–110)
CO2: 40 MMOL/L (ref 21–32)
CREAT SERPL-MCNC: <0.5 MG/DL (ref 0.6–1.2)
GFR SERPL CREATININE-BSD FRML MDRD: >60 ML/MIN/{1.73_M2}
GLUCOSE BLD-MCNC: 78 MG/DL (ref 70–99)
POTASSIUM SERPL-SCNC: 3.8 MMOL/L (ref 3.5–5.1)
SODIUM BLD-SCNC: 139 MMOL/L (ref 136–145)
TOTAL PROTEIN: 6.7 G/DL (ref 6.4–8.2)

## 2022-11-23 ASSESSMENT — ENCOUNTER SYMPTOMS
COUGH: 0
CHEST TIGHTNESS: 0
NAUSEA: 0
STRIDOR: 0
CONSTIPATION: 0
VOMITING: 0
WHEEZING: 0
SHORTNESS OF BREATH: 1
SORE THROAT: 0
DIARRHEA: 0
ABDOMINAL PAIN: 0

## 2022-12-08 DIAGNOSIS — R60.0 LOCALIZED EDEMA: ICD-10-CM

## 2023-01-12 ENCOUNTER — TELEPHONE (OUTPATIENT)
Dept: FAMILY MEDICINE CLINIC | Age: 83
End: 2023-01-12

## 2023-01-12 NOTE — TELEPHONE ENCOUNTER
Patient daughter, Lisa Quesada called to report patient was visiting son out of state and was seen at ER and was dx with pneumonia. Daughter states that patient is supposed to be home Saturday. Lisa Quesada reports patient is very weak and thinks patient would benefit from some home health services. She wants to know what needs to be done for that to happen. Per verbal from Wero Solano, have daughter call once patient is home and she will do an order for home health and then patient would just need to be seen within in 30 days of order. Lisarichard Quesada notified of Berle Sensing recommendation and voiced understanding. She verbalized she would call once patient is home. No further action required at this time.

## 2023-01-16 DIAGNOSIS — I48.0 PAROXYSMAL A-FIB (HCC): ICD-10-CM

## 2023-01-16 DIAGNOSIS — Z87.09 H/O CHRONIC RESPIRATORY FAILURE: ICD-10-CM

## 2023-01-16 DIAGNOSIS — J98.4 RESTRICTIVE LUNG DISEASE: ICD-10-CM

## 2023-01-16 DIAGNOSIS — R53.1 GENERALIZED WEAKNESS: ICD-10-CM

## 2023-01-16 DIAGNOSIS — J18.9 PNEUMONIA DUE TO INFECTIOUS ORGANISM, UNSPECIFIED LATERALITY, UNSPECIFIED PART OF LUNG: Primary | ICD-10-CM

## 2023-01-16 DIAGNOSIS — G62.9 PERIPHERAL POLYNEUROPATHY: ICD-10-CM

## 2023-01-27 ENCOUNTER — HOSPITAL ENCOUNTER (OUTPATIENT)
Age: 83
Discharge: HOME | End: 2023-01-27
Payer: MEDICARE

## 2023-01-27 DIAGNOSIS — D64.9 LOW HEMOGLOBIN: Primary | ICD-10-CM

## 2023-01-27 DIAGNOSIS — R79.81 ELEVATED CO2 LEVEL: ICD-10-CM

## 2023-01-27 LAB
ALBUMIN SERPL-MCNC: 3.5 GM/DL (ref 3.4–5)
ALP BLD-CCNC: 82 IU/L (ref 40–129)
ALT SERPL-CCNC: 11 U/L (ref 10–40)
ANION GAP SERPL CALCULATED.3IONS-SCNC: 3 MMOL/L (ref 4–16)
AST SERPL-CCNC: 20 IU/L (ref 15–37)
BASOPHILS ABSOLUTE: 0 K/CU MM
BASOPHILS RELATIVE PERCENT: 0.2 % (ref 0–1)
BILIRUB SERPL-MCNC: 0.2 MG/DL (ref 0–1)
BUN BLDV-MCNC: 12 MG/DL (ref 6–23)
CALCIUM SERPL-MCNC: 9.9 MG/DL (ref 8.3–10.6)
CHLORIDE BLD-SCNC: 93 MMOL/L (ref 99–110)
CO2: 45 MMOL/L (ref 21–32)
CREAT SERPL-MCNC: 0.4 MG/DL (ref 0.6–1.1)
DIFFERENTIAL TYPE: ABNORMAL
EOSINOPHILS ABSOLUTE: 0.2 K/CU MM
EOSINOPHILS RELATIVE PERCENT: 4.8 % (ref 0–3)
GFR SERPL CREATININE-BSD FRML MDRD: >60 ML/MIN/1.73M2
GLUCOSE FASTING: 111 MG/DL (ref 70–99)
HCT VFR BLD CALC: 34.5 % (ref 37–47)
HEMOGLOBIN: 10.4 GM/DL (ref 12.5–16)
IMMATURE NEUTROPHIL %: 0.2 % (ref 0–0.43)
LYMPHOCYTES ABSOLUTE: 0.5 K/CU MM
LYMPHOCYTES RELATIVE PERCENT: 11.5 % (ref 24–44)
MCH RBC QN AUTO: 30.8 PG (ref 27–31)
MCHC RBC AUTO-ENTMCNC: 30.1 % (ref 32–36)
MCV RBC AUTO: 102.1 FL (ref 78–100)
MONOCYTES ABSOLUTE: 0.4 K/CU MM
MONOCYTES RELATIVE PERCENT: 9.2 % (ref 0–4)
PDW BLD-RTO: 13.3 % (ref 11.7–14.9)
PLATELET # BLD: 178 K/CU MM (ref 140–440)
PMV BLD AUTO: 8.4 FL (ref 7.5–11.1)
POTASSIUM SERPL-SCNC: 4.3 MMOL/L (ref 3.5–5.1)
RBC # BLD: 3.38 M/CU MM (ref 4.2–5.4)
SEGMENTED NEUTROPHILS ABSOLUTE COUNT: 3.2 K/CU MM
SEGMENTED NEUTROPHILS RELATIVE PERCENT: 74.1 % (ref 36–66)
SODIUM BLD-SCNC: 141 MMOL/L (ref 135–145)
TOTAL IMMATURE NEUTOROPHIL: 0.01 K/CU MM
TOTAL PROTEIN: 7.2 GM/DL (ref 6.4–8.2)
WBC # BLD: 4.4 K/CU MM (ref 4–10.5)

## 2023-01-27 PROCEDURE — 85025 COMPLETE CBC W/AUTO DIFF WBC: CPT

## 2023-01-27 PROCEDURE — 36415 COLL VENOUS BLD VENIPUNCTURE: CPT

## 2023-01-27 PROCEDURE — 80053 COMPREHEN METABOLIC PANEL: CPT

## 2023-02-02 ENCOUNTER — OFFICE VISIT (OUTPATIENT)
Dept: FAMILY MEDICINE CLINIC | Age: 83
End: 2023-02-02
Payer: MEDICARE

## 2023-02-02 VITALS
WEIGHT: 127.8 LBS | HEART RATE: 79 BPM | SYSTOLIC BLOOD PRESSURE: 106 MMHG | TEMPERATURE: 98.1 F | OXYGEN SATURATION: 96 % | BODY MASS INDEX: 23.37 KG/M2 | DIASTOLIC BLOOD PRESSURE: 58 MMHG | RESPIRATION RATE: 16 BRPM

## 2023-02-02 DIAGNOSIS — I48.0 PAROXYSMAL A-FIB (HCC): ICD-10-CM

## 2023-02-02 DIAGNOSIS — J98.4 RESTRICTIVE LUNG DISEASE: ICD-10-CM

## 2023-02-02 DIAGNOSIS — I10 PRIMARY HYPERTENSION: ICD-10-CM

## 2023-02-02 DIAGNOSIS — J18.9 PNEUMONIA OF BOTH LOWER LOBES DUE TO INFECTIOUS ORGANISM: Primary | ICD-10-CM

## 2023-02-02 PROCEDURE — G8427 DOCREV CUR MEDS BY ELIG CLIN: HCPCS | Performed by: NURSE PRACTITIONER

## 2023-02-02 PROCEDURE — 3078F DIAST BP <80 MM HG: CPT | Performed by: NURSE PRACTITIONER

## 2023-02-02 PROCEDURE — 99214 OFFICE O/P EST MOD 30 MIN: CPT | Performed by: NURSE PRACTITIONER

## 2023-02-02 PROCEDURE — 1090F PRES/ABSN URINE INCON ASSESS: CPT | Performed by: NURSE PRACTITIONER

## 2023-02-02 PROCEDURE — 1036F TOBACCO NON-USER: CPT | Performed by: NURSE PRACTITIONER

## 2023-02-02 PROCEDURE — G8399 PT W/DXA RESULTS DOCUMENT: HCPCS | Performed by: NURSE PRACTITIONER

## 2023-02-02 PROCEDURE — G8484 FLU IMMUNIZE NO ADMIN: HCPCS | Performed by: NURSE PRACTITIONER

## 2023-02-02 PROCEDURE — 3074F SYST BP LT 130 MM HG: CPT | Performed by: NURSE PRACTITIONER

## 2023-02-02 PROCEDURE — 1123F ACP DISCUSS/DSCN MKR DOCD: CPT | Performed by: NURSE PRACTITIONER

## 2023-02-02 PROCEDURE — G8420 CALC BMI NORM PARAMETERS: HCPCS | Performed by: NURSE PRACTITIONER

## 2023-02-02 RX ORDER — MONTELUKAST SODIUM 10 MG/1
10 TABLET ORAL DAILY
COMMUNITY
Start: 2023-02-02

## 2023-02-02 ASSESSMENT — PATIENT HEALTH QUESTIONNAIRE - PHQ9
SUM OF ALL RESPONSES TO PHQ QUESTIONS 1-9: 1
2. FEELING DOWN, DEPRESSED OR HOPELESS: 1
SUM OF ALL RESPONSES TO PHQ QUESTIONS 1-9: 1
SUM OF ALL RESPONSES TO PHQ QUESTIONS 1-9: 1
1. LITTLE INTEREST OR PLEASURE IN DOING THINGS: 0
SUM OF ALL RESPONSES TO PHQ9 QUESTIONS 1 & 2: 1
SUM OF ALL RESPONSES TO PHQ QUESTIONS 1-9: 1

## 2023-02-02 NOTE — PROGRESS NOTES
Subjective:      Chief Complaint   Patient presents with    Follow-up     Was treated for pneumonia, home health has come out a couple times. Patient states she is feeling better       HPI:  Phong Fletcher is a 81 y.o. female who presents today for ER follow up. She was visiting family out of state over the holidays and presented to the ED on 12/29/22 with c/o shortness of breath and AMS. She has fallen the night before; she denies hitting her head or LOC. Family was concerned the next day when Yuan Peres appeared confused and more drowsy than normal.  She was not wearing bipap and her nasal cannula had fallen out overnight. While in the ED she was diagnosed with bilateral pneumonia and discharged home with a prescription for Augmentin. Since returning home she is starting to regain her strength. Her appetite is slowly improving. She has already followed up with her pulmonologist.  She is being followed by home health with skilled nursing services, physical therapy and occupational therapy to help her return to baseline. Past Medical History:   Diagnosis Date    Anxiety     H/O chronic respiratory failure 01/21/2020    High risk medication use 01/21/2020    HTN (hypertension)     Mitral valve prolapse     SANDRA (obstructive sleep apnea)     Paroxysmal atrial fibrillation (HCC)     Peripheral neuropathy 01/21/2020    Restrictive lung disease     chronic home O2 - 2 liters NC    Scoliosis         Social History     Tobacco Use    Smoking status: Never    Smokeless tobacco: Never   Substance Use Topics    Alcohol use: Yes     Comment: occaisonal wine        Review of Systems   Constitutional:  Positive for fatigue. Negative for activity change, appetite change, chills, fever and unexpected weight change. HENT:  Negative for congestion, ear pain, hearing loss, sore throat and tinnitus. Eyes:  Negative for visual disturbance. Respiratory:  Positive for shortness of breath.  Negative for cough, chest tightness, wheezing and stridor.         Pt at baseline   Cardiovascular:  Positive for leg swelling. Negative for chest pain and palpitations.   Gastrointestinal:  Negative for abdominal pain, constipation, diarrhea, nausea and vomiting.   Musculoskeletal:  Negative for arthralgias and gait problem.   Skin: Negative.    Neurological:  Negative for dizziness, tremors, seizures, syncope, speech difficulty, weakness and headaches.   Hematological:  Negative for adenopathy.   Psychiatric/Behavioral:  Negative for behavioral problems, confusion, sleep disturbance and suicidal ideas. The patient is not nervous/anxious.          Objective:      BP (!) 106/58 (Site: Right Upper Arm, Position: Sitting, Cuff Size: Medium Adult)   Pulse 79   Temp 98.1 °F (36.7 °C)   Resp 16   Wt 127 lb 12.8 oz (58 kg)   SpO2 96% Comment: on 3 liters  BMI 23.37 kg/m²      Physical Exam  Vitals reviewed.   Constitutional:       General: She is not in acute distress.     Appearance: Normal appearance. She is not ill-appearing.   HENT:      Head: Normocephalic.      Right Ear: External ear normal.      Left Ear: External ear normal.      Mouth/Throat:      Mouth: Mucous membranes are moist.      Pharynx: Oropharynx is clear.   Eyes:      Extraocular Movements: Extraocular movements intact.      Conjunctiva/sclera: Conjunctivae normal.      Pupils: Pupils are equal, round, and reactive to light.   Neck:      Vascular: No carotid bruit.   Cardiovascular:      Rate and Rhythm: Normal rate and regular rhythm.      Heart sounds: Normal heart sounds.      Comments: Pt is wearing compression stockings  Pulmonary:      Effort: Pulmonary effort is normal. No respiratory distress.      Breath sounds: Decreased air movement (thoughout) present. No stridor. No wheezing, rhonchi or rales.   Musculoskeletal:         General: Normal range of motion.      Cervical back: Normal range of motion and neck supple.      Right lower le+ Edema present.       Left lower le+ Edema present. Skin:     General: Skin is warm and dry. Capillary Refill: Capillary refill takes less than 2 seconds. Neurological:      Mental Status: She is alert and oriented to person, place, and time. Assessment / Plan:      1. Pneumonia of both lower lobes due to infectious organism  Resolved. Pt to return for new or worsening symptoms. The above named patient requires nursing service assessments and PT/OT evaluations because of pneumonia/COPD exacerbation resulting in increased shortness of breath, severe weakness, cough and decreased oxygen saturation levels. She is currently homebound with a walker for her unsteady gait. 2. Restrictive lung disease  Follow up with pulmonology as scheduled. 3. Primary hypertension  BP well controlled, continue current medication. Low sodium diet encouraged. 4. Paroxysmal A-fib (HCC)  Continue daily ASA , beta blocker. Follow up with cardiology as scheduled. The patient,Heidi Wilkerson,  was seen with a total time spent of 30 minutes for the visit on this date of service by the E/M provider. The time component had both face to face and non face to face time spent in determining the total time component. Counseling and education regarding diagnosis listed and options regarding those diagnoses were also included in determining the time component.          JEREMY Wei NP

## 2023-02-12 ASSESSMENT — ENCOUNTER SYMPTOMS
STRIDOR: 0
COUGH: 0
CONSTIPATION: 0
WHEEZING: 0
CHEST TIGHTNESS: 0
VOMITING: 0
SHORTNESS OF BREATH: 1
NAUSEA: 0
DIARRHEA: 0
SORE THROAT: 0
ABDOMINAL PAIN: 0

## 2023-03-01 ENCOUNTER — TELEPHONE (OUTPATIENT)
Dept: FAMILY MEDICINE CLINIC | Age: 83
End: 2023-03-01

## 2023-03-28 ENCOUNTER — TELEPHONE (OUTPATIENT)
Dept: FAMILY MEDICINE CLINIC | Age: 83
End: 2023-03-28

## 2023-03-28 NOTE — TELEPHONE ENCOUNTER
She fell this morning around 10 am. She fell on her right side and the only pain she is having is her right toe. She encouraged her to go to the ER to get checked out and she said she would when her family got home later.

## 2023-04-20 ENCOUNTER — TELEPHONE (OUTPATIENT)
Dept: FAMILY MEDICINE CLINIC | Age: 83
End: 2023-04-20

## 2023-04-20 DIAGNOSIS — G62.9 PERIPHERAL POLYNEUROPATHY: Primary | ICD-10-CM

## 2023-04-20 DIAGNOSIS — I10 PRIMARY HYPERTENSION: ICD-10-CM

## 2023-04-28 LAB
CHOLESTEROL, TOTAL: 163 MG/DL
CHOLESTEROL/HDL RATIO: ABNORMAL
FOLATE: >20
HDLC SERPL-MCNC: 101 MG/DL (ref 35–70)
LDL CHOLESTEROL CALCULATED: 52 MG/DL (ref 0–160)
NONHDLC SERPL-MCNC: ABNORMAL MG/DL
TRIGL SERPL-MCNC: 52 MG/DL
VITAMIN B-12: 1559
VLDLC SERPL CALC-MCNC: 10 MG/DL

## 2023-05-04 ENCOUNTER — OFFICE VISIT (OUTPATIENT)
Dept: FAMILY MEDICINE CLINIC | Age: 83
End: 2023-05-04
Payer: MEDICARE

## 2023-05-04 VITALS
TEMPERATURE: 97.2 F | HEART RATE: 55 BPM | DIASTOLIC BLOOD PRESSURE: 62 MMHG | BODY MASS INDEX: 22.39 KG/M2 | RESPIRATION RATE: 18 BRPM | WEIGHT: 122.4 LBS | OXYGEN SATURATION: 97 % | SYSTOLIC BLOOD PRESSURE: 96 MMHG

## 2023-05-04 DIAGNOSIS — I10 PRIMARY HYPERTENSION: ICD-10-CM

## 2023-05-04 DIAGNOSIS — G62.9 PERIPHERAL POLYNEUROPATHY: Primary | ICD-10-CM

## 2023-05-04 PROCEDURE — 1123F ACP DISCUSS/DSCN MKR DOCD: CPT | Performed by: NURSE PRACTITIONER

## 2023-05-04 PROCEDURE — G8399 PT W/DXA RESULTS DOCUMENT: HCPCS | Performed by: NURSE PRACTITIONER

## 2023-05-04 PROCEDURE — 3078F DIAST BP <80 MM HG: CPT | Performed by: NURSE PRACTITIONER

## 2023-05-04 PROCEDURE — 99214 OFFICE O/P EST MOD 30 MIN: CPT | Performed by: NURSE PRACTITIONER

## 2023-05-04 PROCEDURE — 3074F SYST BP LT 130 MM HG: CPT | Performed by: NURSE PRACTITIONER

## 2023-05-04 PROCEDURE — 1036F TOBACCO NON-USER: CPT | Performed by: NURSE PRACTITIONER

## 2023-05-04 PROCEDURE — 1090F PRES/ABSN URINE INCON ASSESS: CPT | Performed by: NURSE PRACTITIONER

## 2023-05-04 PROCEDURE — G8427 DOCREV CUR MEDS BY ELIG CLIN: HCPCS | Performed by: NURSE PRACTITIONER

## 2023-05-04 PROCEDURE — G8420 CALC BMI NORM PARAMETERS: HCPCS | Performed by: NURSE PRACTITIONER

## 2023-05-04 RX ORDER — LISINOPRIL 20 MG/1
20 TABLET ORAL DAILY
Qty: 90 TABLET | Refills: 1 | Status: SHIPPED | OUTPATIENT
Start: 2023-05-04

## 2023-05-04 RX ORDER — GABAPENTIN 100 MG/1
100 CAPSULE ORAL 2 TIMES DAILY
Qty: 180 CAPSULE | Refills: 0 | Status: SHIPPED | OUTPATIENT
Start: 2023-05-04 | End: 2023-08-02

## 2023-05-04 SDOH — ECONOMIC STABILITY: INCOME INSECURITY: HOW HARD IS IT FOR YOU TO PAY FOR THE VERY BASICS LIKE FOOD, HOUSING, MEDICAL CARE, AND HEATING?: NOT VERY HARD

## 2023-05-04 SDOH — ECONOMIC STABILITY: FOOD INSECURITY: WITHIN THE PAST 12 MONTHS, YOU WORRIED THAT YOUR FOOD WOULD RUN OUT BEFORE YOU GOT MONEY TO BUY MORE.: NEVER TRUE

## 2023-05-04 SDOH — ECONOMIC STABILITY: FOOD INSECURITY: WITHIN THE PAST 12 MONTHS, THE FOOD YOU BOUGHT JUST DIDN'T LAST AND YOU DIDN'T HAVE MONEY TO GET MORE.: NEVER TRUE

## 2023-05-04 SDOH — ECONOMIC STABILITY: HOUSING INSECURITY
IN THE LAST 12 MONTHS, WAS THERE A TIME WHEN YOU DID NOT HAVE A STEADY PLACE TO SLEEP OR SLEPT IN A SHELTER (INCLUDING NOW)?: NO

## 2023-05-15 ASSESSMENT — ENCOUNTER SYMPTOMS
WHEEZING: 0
STRIDOR: 0
VOMITING: 0
DIARRHEA: 0
NAUSEA: 0
COUGH: 0
CONSTIPATION: 0
CHEST TIGHTNESS: 0
SORE THROAT: 0
ABDOMINAL PAIN: 0
SHORTNESS OF BREATH: 1

## 2023-08-03 ENCOUNTER — OFFICE VISIT (OUTPATIENT)
Dept: FAMILY MEDICINE CLINIC | Age: 83
End: 2023-08-03
Payer: MEDICARE

## 2023-08-03 VITALS
WEIGHT: 124.2 LBS | DIASTOLIC BLOOD PRESSURE: 60 MMHG | OXYGEN SATURATION: 96 % | HEART RATE: 64 BPM | RESPIRATION RATE: 18 BRPM | BODY MASS INDEX: 22.72 KG/M2 | SYSTOLIC BLOOD PRESSURE: 98 MMHG

## 2023-08-03 DIAGNOSIS — I10 PRIMARY HYPERTENSION: Primary | ICD-10-CM

## 2023-08-03 DIAGNOSIS — L89.321 PRESSURE INJURY OF LEFT BUTTOCK, STAGE 1: ICD-10-CM

## 2023-08-03 PROCEDURE — G8427 DOCREV CUR MEDS BY ELIG CLIN: HCPCS | Performed by: NURSE PRACTITIONER

## 2023-08-03 PROCEDURE — G8399 PT W/DXA RESULTS DOCUMENT: HCPCS | Performed by: NURSE PRACTITIONER

## 2023-08-03 PROCEDURE — G8420 CALC BMI NORM PARAMETERS: HCPCS | Performed by: NURSE PRACTITIONER

## 2023-08-03 PROCEDURE — 1090F PRES/ABSN URINE INCON ASSESS: CPT | Performed by: NURSE PRACTITIONER

## 2023-08-03 PROCEDURE — 3074F SYST BP LT 130 MM HG: CPT | Performed by: NURSE PRACTITIONER

## 2023-08-03 PROCEDURE — 99214 OFFICE O/P EST MOD 30 MIN: CPT | Performed by: NURSE PRACTITIONER

## 2023-08-03 PROCEDURE — 1123F ACP DISCUSS/DSCN MKR DOCD: CPT | Performed by: NURSE PRACTITIONER

## 2023-08-03 PROCEDURE — 3078F DIAST BP <80 MM HG: CPT | Performed by: NURSE PRACTITIONER

## 2023-08-03 PROCEDURE — 1036F TOBACCO NON-USER: CPT | Performed by: NURSE PRACTITIONER

## 2023-08-03 RX ORDER — LISINOPRIL 10 MG/1
10 TABLET ORAL DAILY
Qty: 30 TABLET | Refills: 0 | Status: SHIPPED | OUTPATIENT
Start: 2023-08-03 | End: 2023-09-02

## 2023-08-03 RX ORDER — LISINOPRIL 20 MG/1
20 TABLET ORAL DAILY
Qty: 90 TABLET | Refills: 1 | Status: CANCELLED | OUTPATIENT
Start: 2023-08-03

## 2023-08-03 ASSESSMENT — PATIENT HEALTH QUESTIONNAIRE - PHQ9
SUM OF ALL RESPONSES TO PHQ QUESTIONS 1-9: 1
1. LITTLE INTEREST OR PLEASURE IN DOING THINGS: 0
SUM OF ALL RESPONSES TO PHQ9 QUESTIONS 1 & 2: 1
SUM OF ALL RESPONSES TO PHQ QUESTIONS 1-9: 1
2. FEELING DOWN, DEPRESSED OR HOPELESS: 1
SUM OF ALL RESPONSES TO PHQ QUESTIONS 1-9: 1
SUM OF ALL RESPONSES TO PHQ QUESTIONS 1-9: 1

## 2023-08-03 NOTE — PROGRESS NOTES
Subjective:      Chief Complaint   Patient presents with    3 Month Follow-Up    Other     States that there is a painful spot on tailbone. Feels like her skin is chapped       HPI:  Nato Rai is a 81 y.o. female who presents today for the following:     Atrial fibrillation/HTN  She is prescribed ASA 81 mg daily,  sotalol 80 mg BID and Lisinopril 20 mg daily. She has not been on oral anticoagulation due to patient felt to be high risk of frequent falls, anemia as well as advanced age. Her blood pressure in low at home and in the office. She notes that she is more fatigued and weak than normal.  She follows with cardiology and her next appointment is scheduled for 10/26/23. Rash  Anne Dooley c/o of red, painful area on her coccyx which is worse on the left side. She reports that her skin feels raw and is painful. She is inactive due to physical limitations. She has been applying tea tree/clotrimazole ointment but hasn't noted any improvement. Past Medical History:   Diagnosis Date    Anxiety     H/O chronic respiratory failure 01/21/2020    High risk medication use 01/21/2020    HTN (hypertension)     Mitral valve prolapse     SANDRA (obstructive sleep apnea)     Paroxysmal atrial fibrillation (HCC)     Peripheral neuropathy 01/21/2020    Restrictive lung disease     chronic home O2 - 2 liters NC    Scoliosis         Social History     Tobacco Use    Smoking status: Never    Smokeless tobacco: Never   Substance Use Topics    Alcohol use: Yes     Comment: occaisonal wine        Review of Systems   Constitutional:  Negative for activity change, appetite change, chills, fatigue, fever and unexpected weight change. HENT:  Negative for congestion, ear pain, hearing loss, sore throat and tinnitus. Eyes:  Negative for visual disturbance. Respiratory:  Negative for cough, chest tightness, shortness of breath, wheezing and stridor. Cardiovascular:  Negative for chest pain, palpitations and leg swelling.

## 2023-08-03 NOTE — PATIENT INSTRUCTIONS
Welcome to [facility]:    Did you know we now have a faster way for you to move through your appointment? For your convenience, we now have digital registration available. When you schedule your next appointment, you will receive a link via your email as well as a text message that will allow you to complete any paperwork digitally before your appointment. We are committed to providing you the best care possible. If you receive a survey after visiting one of our offices, please take time to share your experience concerning your physician office visit. These surveys are confidential and no health information about you is shared. We are eager to improve for you and continue to give you satisfactory care, we are counting on your feedback to help make that happen.

## 2023-08-13 PROBLEM — L89.301 PRESSURE INJURY OF BUTTOCK, STAGE 1: Status: ACTIVE | Noted: 2023-08-13

## 2023-08-13 ASSESSMENT — ENCOUNTER SYMPTOMS
CONSTIPATION: 0
VOMITING: 0
CHEST TIGHTNESS: 0
WHEEZING: 0
STRIDOR: 0
NAUSEA: 0
COUGH: 0
ABDOMINAL PAIN: 0
SORE THROAT: 0
COLOR CHANGE: 1
SHORTNESS OF BREATH: 0
DIARRHEA: 0

## 2023-08-28 DIAGNOSIS — I10 PRIMARY HYPERTENSION: ICD-10-CM

## 2023-08-28 RX ORDER — LISINOPRIL 10 MG/1
10 TABLET ORAL DAILY
Qty: 30 TABLET | Refills: 0 | Status: SHIPPED | OUTPATIENT
Start: 2023-08-28 | End: 2023-09-27

## 2023-08-31 DIAGNOSIS — I10 PRIMARY HYPERTENSION: ICD-10-CM

## 2023-08-31 RX ORDER — LISINOPRIL 10 MG/1
10 TABLET ORAL DAILY
Qty: 30 TABLET | Refills: 5 | OUTPATIENT
Start: 2023-08-31 | End: 2023-09-30

## 2023-09-07 ENCOUNTER — TELEPHONE (OUTPATIENT)
Dept: FAMILY MEDICINE CLINIC | Age: 83
End: 2023-09-07

## 2023-09-07 NOTE — TELEPHONE ENCOUNTER
Patient called to find out when the next time she is due to have electrolytes checked. Per Thanh Lizama if patient is feeling okay it can wait and we will check at 11/9/2023 appt. Patient notified of above. She states she forgot about her November appt and stated that is fine to check then. I asked patient if she was feeling okay. She stated she was feeling fine. Advised patient if that was to change between now and her appt to let us know. Patient voiced understanding. No further action required at this time.

## 2023-09-26 DIAGNOSIS — G62.9 PERIPHERAL POLYNEUROPATHY: ICD-10-CM

## 2023-09-26 DIAGNOSIS — I10 PRIMARY HYPERTENSION: ICD-10-CM

## 2023-09-26 RX ORDER — GABAPENTIN 100 MG/1
100 CAPSULE ORAL 2 TIMES DAILY
Qty: 180 CAPSULE | Refills: 0 | Status: SHIPPED | OUTPATIENT
Start: 2023-09-26 | End: 2023-12-25

## 2023-09-26 RX ORDER — LISINOPRIL 10 MG/1
10 TABLET ORAL DAILY
Qty: 30 TABLET | Refills: 0 | Status: SHIPPED | OUTPATIENT
Start: 2023-09-26 | End: 2023-10-26

## 2023-09-27 DIAGNOSIS — I10 PRIMARY HYPERTENSION: ICD-10-CM

## 2023-09-28 RX ORDER — LISINOPRIL 10 MG/1
10 TABLET ORAL DAILY
Qty: 30 TABLET | Refills: 5 | OUTPATIENT
Start: 2023-09-28 | End: 2023-10-28

## 2023-09-29 ENCOUNTER — OFFICE VISIT (OUTPATIENT)
Dept: INTERNAL MEDICINE CLINIC | Age: 83
End: 2023-09-29
Payer: MEDICARE

## 2023-09-29 VITALS
SYSTOLIC BLOOD PRESSURE: 98 MMHG | WEIGHT: 124 LBS | HEART RATE: 60 BPM | BODY MASS INDEX: 22.68 KG/M2 | DIASTOLIC BLOOD PRESSURE: 62 MMHG | OXYGEN SATURATION: 91 %

## 2023-09-29 DIAGNOSIS — H61.23 BILATERAL IMPACTED CERUMEN: Primary | ICD-10-CM

## 2023-09-29 PROCEDURE — G8399 PT W/DXA RESULTS DOCUMENT: HCPCS | Performed by: PHYSICIAN ASSISTANT

## 2023-09-29 PROCEDURE — 3074F SYST BP LT 130 MM HG: CPT | Performed by: PHYSICIAN ASSISTANT

## 2023-09-29 PROCEDURE — G8420 CALC BMI NORM PARAMETERS: HCPCS | Performed by: PHYSICIAN ASSISTANT

## 2023-09-29 PROCEDURE — G8427 DOCREV CUR MEDS BY ELIG CLIN: HCPCS | Performed by: PHYSICIAN ASSISTANT

## 2023-09-29 PROCEDURE — 1123F ACP DISCUSS/DSCN MKR DOCD: CPT | Performed by: PHYSICIAN ASSISTANT

## 2023-09-29 PROCEDURE — 1090F PRES/ABSN URINE INCON ASSESS: CPT | Performed by: PHYSICIAN ASSISTANT

## 2023-09-29 PROCEDURE — 99213 OFFICE O/P EST LOW 20 MIN: CPT | Performed by: PHYSICIAN ASSISTANT

## 2023-09-29 PROCEDURE — 69209 REMOVE IMPACTED EAR WAX UNI: CPT | Performed by: PHYSICIAN ASSISTANT

## 2023-09-29 PROCEDURE — 3078F DIAST BP <80 MM HG: CPT | Performed by: PHYSICIAN ASSISTANT

## 2023-09-29 PROCEDURE — 69210 REMOVE IMPACTED EAR WAX UNI: CPT | Performed by: PHYSICIAN ASSISTANT

## 2023-09-29 PROCEDURE — 1036F TOBACCO NON-USER: CPT | Performed by: PHYSICIAN ASSISTANT

## 2023-09-29 NOTE — PROGRESS NOTES
Jasmyne Jha (:  1940) is a 81 y.o. female,Established patient, here for evaluation of the following chief complaint(s):    No chief complaint on file. This is my first patient encounter with Jasmyne Jha; chart reviewed. SUBJECTIVE/OBJECTIVE:  MIKE Jha is a pleasant 81 y.o. female presenting to clinic today for cerumen impaction. Patient reports she saw audiologist yesterday for ear hearing aids consideration; reports they told her she had bilateral cerumen impaction and needed them cleaned out before hearing testing can be completed. Patient reports chronic cerumen impaction and narrow ear canals. Reports this occasionally bothers her. Does not use Q-tips. Allergies   Allergen Reactions    Nsaids     Aspirin Other (See Comments)     Takes lower dose now    Duloxetine Other (See Comments)     Chest tightness    Hydrochlorothiazide      unknown    Hydrochlorothiazide W-Amiloride      Other reaction(s): Other: See Comments  nausea, leg cramps      Iodine      unknown    Meloxicam     Tramadol      Other reaction(s): Other: See Comments  \"feels weird\"         Current Outpatient Medications   Medication Sig Dispense Refill    lisinopril (PRINIVIL;ZESTRIL) 10 MG tablet Take 1 tablet by mouth daily 30 tablet 0    gabapentin (NEURONTIN) 100 MG capsule Take 1 capsule by mouth in the morning and at bedtime for 90 days.  Taking as needed 180 capsule 0    Fexofenadine HCl (ALLEGRA PO) Take by mouth      Wound Dressings (MEPILEX BORDER FLEX LITE) PADS Apply to affected area 10 each 2    fluticasone (FLONASE) 50 MCG/ACT nasal spray 1 spray by Each Nostril route daily 32 g 1    albuterol sulfate HFA (VENTOLIN HFA) 108 (90 Base) MCG/ACT inhaler Inhale 2 puffs into the lungs 4 times daily as needed for Wheezing 3 each 2    UNABLE TO FIND Stationary Oxygen Concentrator at 2 lpm continuous via Nasal Cannula  Portable Gaseous O2 System and contents at 2 lpm via Nasal Cannula  Diagnosis:

## 2023-09-30 PROCEDURE — 69210 REMOVE IMPACTED EAR WAX UNI: CPT | Performed by: PHYSICIAN ASSISTANT

## 2023-09-30 ASSESSMENT — ENCOUNTER SYMPTOMS
VOMITING: 0
CONSTIPATION: 0
CHEST TIGHTNESS: 0
BACK PAIN: 0
ABDOMINAL PAIN: 0
EYE DISCHARGE: 0
BLOOD IN STOOL: 0
EYE PAIN: 0
SHORTNESS OF BREATH: 0
WHEEZING: 0
NAUSEA: 0
EYE REDNESS: 0
COUGH: 0
COLOR CHANGE: 0
SORE THROAT: 0
DIARRHEA: 0
PHOTOPHOBIA: 0
RHINORRHEA: 0

## 2023-11-16 ENCOUNTER — OFFICE VISIT (OUTPATIENT)
Dept: FAMILY MEDICINE CLINIC | Age: 83
End: 2023-11-16
Payer: MEDICARE

## 2023-11-16 VITALS
HEART RATE: 67 BPM | BODY MASS INDEX: 23.41 KG/M2 | WEIGHT: 128 LBS | TEMPERATURE: 97.4 F | DIASTOLIC BLOOD PRESSURE: 62 MMHG | RESPIRATION RATE: 16 BRPM | OXYGEN SATURATION: 96 % | SYSTOLIC BLOOD PRESSURE: 110 MMHG

## 2023-11-16 DIAGNOSIS — E55.9 VITAMIN D DEFICIENCY: ICD-10-CM

## 2023-11-16 DIAGNOSIS — I48.0 PAROXYSMAL A-FIB (HCC): Primary | ICD-10-CM

## 2023-11-16 DIAGNOSIS — G62.9 PERIPHERAL POLYNEUROPATHY: ICD-10-CM

## 2023-11-16 PROCEDURE — 1123F ACP DISCUSS/DSCN MKR DOCD: CPT | Performed by: NURSE PRACTITIONER

## 2023-11-16 PROCEDURE — 1036F TOBACCO NON-USER: CPT | Performed by: NURSE PRACTITIONER

## 2023-11-16 PROCEDURE — 3074F SYST BP LT 130 MM HG: CPT | Performed by: NURSE PRACTITIONER

## 2023-11-16 PROCEDURE — G8427 DOCREV CUR MEDS BY ELIG CLIN: HCPCS | Performed by: NURSE PRACTITIONER

## 2023-11-16 PROCEDURE — 1090F PRES/ABSN URINE INCON ASSESS: CPT | Performed by: NURSE PRACTITIONER

## 2023-11-16 PROCEDURE — 36415 COLL VENOUS BLD VENIPUNCTURE: CPT | Performed by: NURSE PRACTITIONER

## 2023-11-16 PROCEDURE — 99214 OFFICE O/P EST MOD 30 MIN: CPT | Performed by: NURSE PRACTITIONER

## 2023-11-16 PROCEDURE — G8399 PT W/DXA RESULTS DOCUMENT: HCPCS | Performed by: NURSE PRACTITIONER

## 2023-11-16 PROCEDURE — G8484 FLU IMMUNIZE NO ADMIN: HCPCS | Performed by: NURSE PRACTITIONER

## 2023-11-16 PROCEDURE — G8420 CALC BMI NORM PARAMETERS: HCPCS | Performed by: NURSE PRACTITIONER

## 2023-11-16 PROCEDURE — 3078F DIAST BP <80 MM HG: CPT | Performed by: NURSE PRACTITIONER

## 2023-11-16 NOTE — PROGRESS NOTES
Subjective:      Chief Complaint   Patient presents with    Follow-up     SOB with activity, discuss having some labs drawn, would like script for therapeutic massage and [place] antiseptic powder        HPI:  Soy Thomas is a 81 y.o. female who presents today for 3 month follow up. Atrial fibrillation/HTN  She is prescribed ASA 81 mg daily,  sotalol 80 mg BID,  Lisinopril 5 mg daily and Torsemide 40 mg daily. She has not been on oral anticoagulation due to patient felt to be high risk of frequent falls, anemia as well as advanced age. She has been  more short of breath at rest and with exertion. She has one short episode where she felt like she went into afib but converted back to SR within a few minutes. She would like to have blood work drawn today. She is established with cardiology and pulmonology and was seen by both specialities recently. Polyneuropathy  She is prescribed Gabapentin 100 mg BID which is somewhat effective. She would like a prescription for therapeutic massage. Past Medical History:   Diagnosis Date    Anxiety     H/O chronic respiratory failure 01/21/2020    High risk medication use 01/21/2020    HTN (hypertension)     Mitral valve prolapse     SANDRA (obstructive sleep apnea)     Paroxysmal atrial fibrillation (HCC)     Peripheral neuropathy 01/21/2020    Restrictive lung disease     chronic home O2 - 2 liters NC    Scoliosis         Social History     Tobacco Use    Smoking status: Never    Smokeless tobacco: Never   Substance Use Topics    Alcohol use: Yes     Comment: occaisonal wine        Review of Systems   Constitutional:  Positive for fatigue. Negative for activity change, appetite change, chills, fever and unexpected weight change. HENT:  Negative for congestion, ear pain, hearing loss, sore throat and tinnitus. Eyes:  Negative for visual disturbance. Respiratory:  Positive for shortness of breath. Negative for cough, chest tightness, wheezing and stridor.

## 2023-11-17 LAB
25(OH)D3 SERPL-MCNC: 42.7 NG/ML
ANION GAP SERPL CALCULATED.3IONS-SCNC: 11 MMOL/L (ref 3–16)
BUN SERPL-MCNC: 22 MG/DL (ref 7–20)
CALCIUM SERPL-MCNC: 9.2 MG/DL (ref 8.3–10.6)
CHLORIDE SERPL-SCNC: 93 MMOL/L (ref 99–110)
CO2 SERPL-SCNC: 38 MMOL/L (ref 21–32)
CREAT SERPL-MCNC: 0.9 MG/DL (ref 0.6–1.2)
FERRITIN SERPL IA-MCNC: 68.2 NG/ML (ref 15–150)
GFR SERPLBLD CREATININE-BSD FMLA CKD-EPI: >60 ML/MIN/{1.73_M2}
GLUCOSE SERPL-MCNC: 125 MG/DL (ref 70–99)
IRON SATN MFR SERPL: 21 % (ref 15–50)
IRON SERPL-MCNC: 65 UG/DL (ref 37–145)
MAGNESIUM SERPL-MCNC: 2.5 MG/DL (ref 1.8–2.4)
POTASSIUM SERPL-SCNC: 3.4 MMOL/L (ref 3.5–5.1)
SODIUM SERPL-SCNC: 142 MMOL/L (ref 136–145)
T4 FREE SERPL-MCNC: 1.2 NG/DL (ref 0.9–1.8)
TIBC SERPL-MCNC: 304 UG/DL (ref 260–445)
TSH SERPL DL<=0.005 MIU/L-ACNC: 0.78 UIU/ML (ref 0.27–4.2)

## 2023-11-21 ASSESSMENT — ENCOUNTER SYMPTOMS
WHEEZING: 0
CHEST TIGHTNESS: 0
COUGH: 0
STRIDOR: 0
VOMITING: 0
SORE THROAT: 0
CONSTIPATION: 0
NAUSEA: 0
SHORTNESS OF BREATH: 1
ABDOMINAL PAIN: 0
DIARRHEA: 0

## 2023-11-27 ENCOUNTER — TELEPHONE (OUTPATIENT)
Dept: FAMILY MEDICINE CLINIC | Age: 83
End: 2023-11-27

## 2023-11-27 NOTE — TELEPHONE ENCOUNTER
----- Message from Marcie Woodard sent at 11/27/2023 10:19 AM EST -----  Subject: Referral Request    Reason for referral request? Patient would like to have blood drawn in 2   week instead of tomorrow the 11/28. Her cardiologist put her on the   potassium and wanted to get her labs rechecked then. Please call patient   to reschedule the appointment. Provider patient wants to be referred to(if known):     Provider Phone Number(if known):     Additional Information for Provider?   ---------------------------------------------------------------------------  --------------  Kristie [place]    [phone]; OK to leave message on voicemail  ---------------------------------------------------------------------------  --------------

## 2024-01-16 ENCOUNTER — TELEPHONE (OUTPATIENT)
Dept: FAMILY MEDICINE CLINIC | Age: 84
End: 2024-01-16

## 2024-01-16 RX ORDER — PRAMIPEXOLE DIHYDROCHLORIDE 0.12 MG/1
0.12 TABLET ORAL 3 TIMES DAILY
Qty: 90 TABLET | Refills: 3 | Status: SHIPPED | OUTPATIENT
Start: 2024-01-16 | End: 2024-01-19

## 2024-01-16 RX ORDER — PANTOPRAZOLE SODIUM 40 MG/1
40 TABLET, DELAYED RELEASE ORAL
Qty: 90 TABLET | Refills: 3 | Status: SHIPPED | OUTPATIENT
Start: 2024-01-16

## 2024-01-16 NOTE — TELEPHONE ENCOUNTER
Patient needs pantoprazole (PROTONIX) 40 mg enteric-coated tablet , pramipexole (MIRAPEX)  0.125 mg tablet she also needs orders for oxygen 2-3 L 24/7 sent to [facility] .

## 2024-01-19 ENCOUNTER — OFFICE VISIT (OUTPATIENT)
Dept: FAMILY MEDICINE CLINIC | Age: 84
End: 2024-01-19

## 2024-01-19 VITALS
DIASTOLIC BLOOD PRESSURE: 80 MMHG | OXYGEN SATURATION: 92 % | RESPIRATION RATE: 17 BRPM | BODY MASS INDEX: 23.55 KG/M2 | HEART RATE: 63 BPM | HEIGHT: 62 IN | WEIGHT: 128 LBS | SYSTOLIC BLOOD PRESSURE: 126 MMHG

## 2024-01-19 DIAGNOSIS — G25.81 RLS (RESTLESS LEGS SYNDROME): ICD-10-CM

## 2024-01-19 DIAGNOSIS — I10 PRIMARY HYPERTENSION: ICD-10-CM

## 2024-01-19 DIAGNOSIS — Z09 HOSPITAL DISCHARGE FOLLOW-UP: Primary | ICD-10-CM

## 2024-01-19 DIAGNOSIS — J96.22 ACUTE ON CHRONIC RESPIRATORY FAILURE WITH HYPOXIA AND HYPERCAPNIA (HCC): ICD-10-CM

## 2024-01-19 DIAGNOSIS — J96.21 ACUTE ON CHRONIC RESPIRATORY FAILURE WITH HYPOXIA AND HYPERCAPNIA (HCC): ICD-10-CM

## 2024-01-19 RX ORDER — PRAMIPEXOLE DIHYDROCHLORIDE 0.12 MG/1
TABLET ORAL
Qty: 180 TABLET | Refills: 3 | Status: SHIPPED | OUTPATIENT
Start: 2024-01-19

## 2024-01-19 RX ORDER — SILDENAFIL CITRATE 20 MG/1
20 TABLET ORAL 3 TIMES DAILY
COMMUNITY
Start: 2023-12-31

## 2024-01-19 RX ORDER — LISINOPRIL 2.5 MG/1
2.5 TABLET ORAL 2 TIMES DAILY
Qty: 180 TABLET | Refills: 3 | Status: SHIPPED | OUTPATIENT
Start: 2024-01-19 | End: 2025-01-13

## 2024-01-19 RX ORDER — TORSEMIDE 20 MG/1
20 TABLET ORAL DAILY
COMMUNITY
Start: 2023-10-27

## 2024-01-19 RX ORDER — LISINOPRIL 2.5 MG/1
2.5 TABLET ORAL 2 TIMES DAILY
COMMUNITY
End: 2024-01-19 | Stop reason: SDUPTHER

## 2024-01-19 RX ORDER — ALBUTEROL SULFATE 2.5 MG/3ML
2.5 SOLUTION RESPIRATORY (INHALATION) 4 TIMES DAILY
COMMUNITY
Start: 2019-05-01

## 2024-01-19 RX ORDER — PRAMIPEXOLE DIHYDROCHLORIDE 0.5 MG/1
0.5 TABLET ORAL NIGHTLY
Qty: 90 TABLET | Refills: 3 | Status: SHIPPED | OUTPATIENT
Start: 2024-01-19

## 2024-01-19 ASSESSMENT — PATIENT HEALTH QUESTIONNAIRE - PHQ9
2. FEELING DOWN, DEPRESSED OR HOPELESS: 0
SUM OF ALL RESPONSES TO PHQ9 QUESTIONS 1 & 2: 0
SUM OF ALL RESPONSES TO PHQ QUESTIONS 1-9: 0
1. LITTLE INTEREST OR PLEASURE IN DOING THINGS: 0
SUM OF ALL RESPONSES TO PHQ QUESTIONS 1-9: 0

## 2024-01-19 NOTE — PROGRESS NOTES
daily      torsemide (DEMADEX) 20 MG tablet Take 1 tablet by mouth daily      pramipexole (MIRAPEX) 0.125 MG tablet Take 1 tablet in the morning and 1 tablet in the afternoon 180 tablet 3    pramipexole (MIRAPEX) 0.5 MG tablet Take 1 tablet by mouth nightly 90 tablet 3    lisinopril (PRINIVIL;ZESTRIL) 2.5 MG tablet Take 1 tablet by mouth in the morning and at bedtime 180 tablet 3    pantoprazole (PROTONIX) 40 MG tablet Take 1 tablet by mouth every morning (before breakfast) 90 tablet 3    albuterol sulfate HFA (VENTOLIN HFA) 108 (90 Base) MCG/ACT inhaler Inhale 2 puffs into the lungs 4 times daily as needed for Wheezing 3 each 2    Magnesium Oxide (MAGNESIUM-OXIDE) 250 MG TABS tablet Take 1 tablet by mouth daily 90 tablet 3    UNABLE TO FIND [place] antiseptic powder 1 each 5    UNABLE TO FIND Therapeutic Massage every 2 weeks 1 each 0    montelukast (SINGULAIR) 10 MG tablet Take 1 tablet by mouth daily      gabapentin (NEURONTIN) 100 MG capsule Take 1 capsule by mouth in the morning and at bedtime for 90 days. Taking as needed 180 capsule 0    Wound Dressings (MEPILEX BORDER FLEX LITE) PADS Apply to affected area 10 each 2    fluticasone (FLONASE) 50 MCG/ACT nasal spray 1 spray by Each Nostril route daily 32 g 1    sotalol (BETAPACE) 80 MG tablet Take 1 tablet by mouth 2 times daily 60 tablet 0    UNABLE TO FIND Stationary Oxygen Concentrator at 2 lpm continuous via Nasal Cannula  Portable Gaseous O2 System and contents at 2 lpm via Nasal Cannula  Diagnosis: Restrictive lung disease.  At rest patient's oxygen saturation on room air was 83%.  With 2 lpm via nasal cannula, oxygen saturation 94% 1 Device 0    aspirin 81 MG EC tablet Take 1 tablet by mouth daily      B Complex Vitamins (VITAMIN B COMPLEX) TABS Take 1 tablet by mouth 3 times daily      acetaminophen (TYLENOL) 500 MG tablet acetaminophen TYLENOL EXTRA STRENGTH 500 MG TABS 2 po Q4H prn 2011/05/11 ACETAMINOPHEN [phone] Samantha Minaya 05-

## 2024-02-04 ENCOUNTER — APPOINTMENT (OUTPATIENT)
Dept: CT IMAGING | Age: 84
DRG: 193 | End: 2024-02-04
Attending: EMERGENCY MEDICINE
Payer: MEDICARE

## 2024-02-04 ENCOUNTER — HOSPITAL ENCOUNTER (INPATIENT)
Age: 84
LOS: 3 days | Discharge: NURSING, REHAB OR LONG-TERM CARE | DRG: 193 | End: 2024-02-07
Attending: EMERGENCY MEDICINE | Admitting: INTERNAL MEDICINE
Payer: MEDICARE

## 2024-02-04 ENCOUNTER — APPOINTMENT (OUTPATIENT)
Dept: CT IMAGING | Age: 84
DRG: 193 | End: 2024-02-04
Payer: MEDICARE

## 2024-02-04 DIAGNOSIS — J18.9 PNEUMONIA OF BOTH LOWER LOBES DUE TO INFECTIOUS ORGANISM: ICD-10-CM

## 2024-02-04 DIAGNOSIS — S09.90XA CLOSED HEAD INJURY, INITIAL ENCOUNTER: ICD-10-CM

## 2024-02-04 DIAGNOSIS — J96.01 ACUTE RESPIRATORY FAILURE WITH HYPOXIA AND HYPERCAPNIA (HCC): Primary | ICD-10-CM
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
## 2024-02-04 DIAGNOSIS — J96.02 ACUTE RESPIRATORY FAILURE WITH HYPOXIA AND HYPERCAPNIA (HCC): Primary | ICD-10-CM

## 2024-02-04 DIAGNOSIS — S81.812A LEG LACERATION, LEFT, INITIAL ENCOUNTER: ICD-10-CM

## 2024-02-04 LAB
ALBUMIN SERPL-MCNC: 4 GM/DL (ref 3.4–5)
ALP BLD-CCNC: 80 IU/L (ref 40–129)
ALT SERPL-CCNC: 12 U/L (ref 10–40)
ANION GAP SERPL CALCULATED.3IONS-SCNC: 7 MMOL/L (ref 7–16)
AST SERPL-CCNC: 16 IU/L (ref 15–37)
BASE EXCESS MIXED: 11.1 (ref 0–3)
BASE EXCESS MIXED: 11.1 (ref 0–3)
BASE EXCESS MIXED: 14.9 (ref 0–3)
BASE EXCESS: ABNORMAL (ref 0–3)
BASOPHILS ABSOLUTE: 0 K/CU MM
BASOPHILS RELATIVE PERCENT: 0.2 % (ref 0–1)
BILIRUB SERPL-MCNC: 0.2 MG/DL (ref 0–1)
BUN SERPL-MCNC: 18 MG/DL (ref 6–23)
CALCIUM SERPL-MCNC: 9.3 MG/DL (ref 8.3–10.6)
CHLORIDE BLD-SCNC: 92 MMOL/L (ref 99–110)
CO2 CONTENT: 41.5 MMOL/L (ref 21–32)
CO2 CONTENT: 44.9 MMOL/L (ref 21–32)
CO2 CONTENT: 47.3 MMOL/L (ref 21–32)
CO2: 41 MMOL/L (ref 21–32)
CREAT SERPL-MCNC: 0.7 MG/DL (ref 0.6–1.1)
DIFFERENTIAL TYPE: ABNORMAL
EOSINOPHILS ABSOLUTE: 0 K/CU MM
EOSINOPHILS RELATIVE PERCENT: 0.5 % (ref 0–3)
GFR SERPL CREATININE-BSD FRML MDRD: >60 ML/MIN/1.73M2
GLUCOSE SERPL-MCNC: 98 MG/DL (ref 70–99)
HCO3 ARTERIAL: 39.2 MMOL/L (ref 21–28)
HCO3 VENOUS: 42 MMOL/L (ref 22–29)
HCO3 VENOUS: 44.6 MMOL/L (ref 22–29)
HCT VFR BLD CALC: 36.7 % (ref 37–47)
HEMOGLOBIN: 10.6 GM/DL (ref 12.5–16)
IMMATURE NEUTROPHIL %: 0.2 % (ref 0–0.43)
LYMPHOCYTES ABSOLUTE: 0.5 K/CU MM
LYMPHOCYTES RELATIVE PERCENT: 8.3 % (ref 24–44)
MCH RBC QN AUTO: 31.4 PG (ref 27–31)
MCHC RBC AUTO-ENTMCNC: 28.9 % (ref 32–36)
MCV RBC AUTO: 108.6 FL (ref 78–100)
MONOCYTES ABSOLUTE: 0.4 K/CU MM
MONOCYTES RELATIVE PERCENT: 6.7 % (ref 0–4)
O2 SAT, VEN: 40.3 % (ref 50–70)
O2 SAT, VEN: 98.2 % (ref 50–70)
O2 SATURATION: 96 % (ref 94–98)
PCO2 ARTERIAL: 75.2 MMHG (ref 35–48)
PCO2, VEN: 89.3 MMHG (ref 41–51)
PCO2, VEN: 93.4 MMHG (ref 41–51)
PDW BLD-RTO: 13.5 % (ref 11.7–14.9)
PH BLOOD: 7.33 (ref 7.35–7.45)
PH VENOUS: 7.26 (ref 7.32–7.43)
PH VENOUS: 7.31 (ref 7.32–7.43)
PH VENOUS: 7.31 (ref 7.32–7.43)
PLATELET # BLD: 159 K/CU MM (ref 140–440)
PMV BLD AUTO: 9.2 FL (ref 7.5–11.1)
PO2 ARTERIAL: 92.6 MMHG (ref 83–108)
PO2, VEN: 126.1 MMHG (ref 28–48)
PO2, VEN: 28.3 MMHG (ref 28–48)
POTASSIUM SERPL-SCNC: 4 MMOL/L (ref 3.5–5.1)
PRO-BNP: 643.7 PG/ML
RBC # BLD: 3.38 M/CU MM (ref 4.2–5.4)
SEGMENTED NEUTROPHILS ABSOLUTE COUNT: 5.3 K/CU MM
SEGMENTED NEUTROPHILS RELATIVE PERCENT: 84.1 % (ref 36–66)
SODIUM BLD-SCNC: 140 MMOL/L (ref 135–145)
SOURCE, BLOOD GAS: ABNORMAL
TOTAL IMMATURE NEUTOROPHIL: 0.01 K/CU MM
TOTAL PROTEIN: 6.9 GM/DL (ref 6.4–8.2)
WBC # BLD: 6.3 K/CU MM (ref 4–10.5)

## 2024-02-04 PROCEDURE — 99285 EMERGENCY DEPT VISIT HI MDM: CPT

## 2024-02-04 PROCEDURE — 87040 BLOOD CULTURE FOR BACTERIA: CPT

## 2024-02-04 PROCEDURE — 93005 ELECTROCARDIOGRAM TRACING: CPT | Performed by: NURSE PRACTITIONER

## 2024-02-04 PROCEDURE — 87641 MR-STAPH DNA AMP PROBE: CPT

## 2024-02-04 PROCEDURE — 80053 COMPREHEN METABOLIC PANEL: CPT

## 2024-02-04 PROCEDURE — 2500000003 HC RX 250 WO HCPCS: Performed by: EMERGENCY MEDICINE

## 2024-02-04 PROCEDURE — 1200000000 HC SEMI PRIVATE

## 2024-02-04 PROCEDURE — 2580000003 HC RX 258: Performed by: EMERGENCY MEDICINE

## 2024-02-04 PROCEDURE — 85025 COMPLETE CBC W/AUTO DIFF WBC: CPT

## 2024-02-04 PROCEDURE — 87081 CULTURE SCREEN ONLY: CPT

## 2024-02-04 PROCEDURE — 6370000000 HC RX 637 (ALT 250 FOR IP): Performed by: INTERNAL MEDICINE

## 2024-02-04 PROCEDURE — 12002 RPR S/N/AX/GEN/TRNK2.6-7.5CM: CPT

## 2024-02-04 PROCEDURE — 82800 BLOOD PH: CPT

## 2024-02-04 PROCEDURE — 5A09357 ASSISTANCE WITH RESPIRATORY VENTILATION, LESS THAN 24 CONSECUTIVE HOURS, CONTINUOUS POSITIVE AIRWAY PRESSURE: ICD-10-PCS | Performed by: INTERNAL MEDICINE

## 2024-02-04 PROCEDURE — 94660 CPAP INITIATION&MGMT: CPT

## 2024-02-04 PROCEDURE — 71250 CT THORAX DX C-: CPT

## 2024-02-04 PROCEDURE — 94640 AIRWAY INHALATION TREATMENT: CPT

## 2024-02-04 PROCEDURE — 72125 CT NECK SPINE W/O DYE: CPT

## 2024-02-04 PROCEDURE — 70450 CT HEAD/BRAIN W/O DYE: CPT

## 2024-02-04 PROCEDURE — 6370000000 HC RX 637 (ALT 250 FOR IP): Performed by: NURSE PRACTITIONER

## 2024-02-04 PROCEDURE — 6370000000 HC RX 637 (ALT 250 FOR IP): Performed by: EMERGENCY MEDICINE

## 2024-02-04 PROCEDURE — 82803 BLOOD GASES ANY COMBINATION: CPT

## 2024-02-04 PROCEDURE — 84145 PROCALCITONIN (PCT): CPT

## 2024-02-04 PROCEDURE — 36600 WITHDRAWAL OF ARTERIAL BLOOD: CPT

## 2024-02-04 PROCEDURE — 6360000002 HC RX W HCPCS

## 2024-02-04 PROCEDURE — 6360000002 HC RX W HCPCS: Performed by: NURSE PRACTITIONER

## 2024-02-04 PROCEDURE — 2580000003 HC RX 258: Performed by: NURSE PRACTITIONER

## 2024-02-04 PROCEDURE — 96368 THER/DIAG CONCURRENT INF: CPT

## 2024-02-04 PROCEDURE — 72131 CT LUMBAR SPINE W/O DYE: CPT

## 2024-02-04 PROCEDURE — 72128 CT CHEST SPINE W/O DYE: CPT

## 2024-02-04 PROCEDURE — 83880 ASSAY OF NATRIURETIC PEPTIDE: CPT

## 2024-02-04 PROCEDURE — 96365 THER/PROPH/DIAG IV INF INIT: CPT

## 2024-02-04 PROCEDURE — 82805 BLOOD GASES W/O2 SATURATION: CPT

## 2024-02-04 PROCEDURE — 6360000002 HC RX W HCPCS: Performed by: EMERGENCY MEDICINE

## 2024-02-04 RX ORDER — PANTOPRAZOLE SODIUM 40 MG/1
40 TABLET, DELAYED RELEASE ORAL
Status: DISCONTINUED | OUTPATIENT
Start: 2024-02-05 | End: 2024-02-07 | Stop reason: HOSPADM

## 2024-02-04 RX ORDER — ACETAMINOPHEN 325 MG/1
650 TABLET ORAL ONCE
Status: COMPLETED | OUTPATIENT
Start: 2024-02-04 | End: 2024-02-04

## 2024-02-04 RX ORDER — SODIUM CHLORIDE 0.9 % (FLUSH) 0.9 %
5-40 SYRINGE (ML) INJECTION PRN
Status: DISCONTINUED | OUTPATIENT
Start: 2024-02-04 | End: 2024-02-07 | Stop reason: HOSPADM

## 2024-02-04 RX ORDER — GUAIFENESIN 600 MG/1
600 TABLET, EXTENDED RELEASE ORAL 2 TIMES DAILY
Status: DISCONTINUED | OUTPATIENT
Start: 2024-02-04 | End: 2024-02-07 | Stop reason: HOSPADM

## 2024-02-04 RX ORDER — PRAMIPEXOLE DIHYDROCHLORIDE 0.25 MG/1
0.5 TABLET ORAL NIGHTLY
Status: DISCONTINUED | OUTPATIENT
Start: 2024-02-04 | End: 2024-02-07 | Stop reason: HOSPADM

## 2024-02-04 RX ORDER — SILDENAFIL CITRATE 20 MG/1
20 TABLET ORAL 3 TIMES DAILY
Status: DISCONTINUED | OUTPATIENT
Start: 2024-02-04 | End: 2024-02-07 | Stop reason: HOSPADM

## 2024-02-04 RX ORDER — GABAPENTIN 100 MG/1
100 CAPSULE ORAL ONCE
Status: COMPLETED | OUTPATIENT
Start: 2024-02-04 | End: 2024-02-04

## 2024-02-04 RX ORDER — LISINOPRIL 2.5 MG/1
2.5 TABLET ORAL 2 TIMES DAILY
Status: DISCONTINUED | OUTPATIENT
Start: 2024-02-04 | End: 2024-02-07 | Stop reason: HOSPADM

## 2024-02-04 RX ORDER — ONDANSETRON 2 MG/ML
4 INJECTION INTRAMUSCULAR; INTRAVENOUS EVERY 6 HOURS PRN
Status: CANCELLED | OUTPATIENT
Start: 2024-02-04

## 2024-02-04 RX ORDER — MONTELUKAST SODIUM 10 MG/1
10 TABLET ORAL DAILY
Status: DISCONTINUED | OUTPATIENT
Start: 2024-02-04 | End: 2024-02-05

## 2024-02-04 RX ORDER — PRAMIPEXOLE DIHYDROCHLORIDE 0.25 MG/1
0.12 TABLET ORAL
Status: DISCONTINUED | OUTPATIENT
Start: 2024-02-04 | End: 2024-02-06 | Stop reason: ALTCHOICE

## 2024-02-04 RX ORDER — POLYETHYLENE GLYCOL 3350 17 G/17G
17 POWDER, FOR SOLUTION ORAL DAILY PRN
Status: DISCONTINUED | OUTPATIENT
Start: 2024-02-04 | End: 2024-02-07 | Stop reason: HOSPADM

## 2024-02-04 RX ORDER — LANOLIN ALCOHOL/MO/W.PET/CERES
200 CREAM (GRAM) TOPICAL DAILY
Status: DISCONTINUED | OUTPATIENT
Start: 2024-02-04 | End: 2024-02-07 | Stop reason: HOSPADM

## 2024-02-04 RX ORDER — SODIUM CHLORIDE 0.9 % (FLUSH) 0.9 %
5-40 SYRINGE (ML) INJECTION EVERY 12 HOURS SCHEDULED
Status: DISCONTINUED | OUTPATIENT
Start: 2024-02-04 | End: 2024-02-07 | Stop reason: HOSPADM

## 2024-02-04 RX ORDER — ENOXAPARIN SODIUM 100 MG/ML
40 INJECTION SUBCUTANEOUS DAILY
Status: DISCONTINUED | OUTPATIENT
Start: 2024-02-04 | End: 2024-02-07 | Stop reason: HOSPADM

## 2024-02-04 RX ORDER — SODIUM CHLORIDE 9 MG/ML
INJECTION, SOLUTION INTRAVENOUS PRN
Status: DISCONTINUED | OUTPATIENT
Start: 2024-02-04 | End: 2024-02-07 | Stop reason: HOSPADM

## 2024-02-04 RX ORDER — ACETAMINOPHEN 650 MG/1
650 SUPPOSITORY RECTAL EVERY 6 HOURS PRN
Status: DISCONTINUED | OUTPATIENT
Start: 2024-02-04 | End: 2024-02-07 | Stop reason: HOSPADM

## 2024-02-04 RX ORDER — PRAMIPEXOLE DIHYDROCHLORIDE 0.25 MG/1
0.12 TABLET ORAL EVERY MORNING
Status: DISCONTINUED | OUTPATIENT
Start: 2024-02-05 | End: 2024-02-06 | Stop reason: ALTCHOICE

## 2024-02-04 RX ORDER — SOTALOL HYDROCHLORIDE 80 MG/1
80 TABLET ORAL 2 TIMES DAILY
Status: DISCONTINUED | OUTPATIENT
Start: 2024-02-04 | End: 2024-02-07 | Stop reason: HOSPADM

## 2024-02-04 RX ORDER — ONDANSETRON 4 MG/1
4 TABLET, ORALLY DISINTEGRATING ORAL EVERY 8 HOURS PRN
Status: CANCELLED | OUTPATIENT
Start: 2024-02-04

## 2024-02-04 RX ORDER — IPRATROPIUM BROMIDE AND ALBUTEROL SULFATE 2.5; .5 MG/3ML; MG/3ML
1 SOLUTION RESPIRATORY (INHALATION)
Status: DISCONTINUED | OUTPATIENT
Start: 2024-02-04 | End: 2024-02-07 | Stop reason: HOSPADM

## 2024-02-04 RX ORDER — ACETAMINOPHEN 325 MG/1
650 TABLET ORAL EVERY 6 HOURS PRN
Status: DISCONTINUED | OUTPATIENT
Start: 2024-02-04 | End: 2024-02-07 | Stop reason: HOSPADM

## 2024-02-04 RX ORDER — VANCOMYCIN 1.5 G/300ML
25 INJECTION, SOLUTION INTRAVENOUS ONCE
Status: COMPLETED | OUTPATIENT
Start: 2024-02-04 | End: 2024-02-04

## 2024-02-04 RX ORDER — TORSEMIDE 20 MG/1
20 TABLET ORAL DAILY
Status: DISCONTINUED | OUTPATIENT
Start: 2024-02-04 | End: 2024-02-07 | Stop reason: HOSPADM

## 2024-02-04 RX ORDER — DEXTROSE AND SODIUM CHLORIDE 5; .45 G/100ML; G/100ML
INJECTION, SOLUTION INTRAVENOUS CONTINUOUS
Status: DISCONTINUED | OUTPATIENT
Start: 2024-02-04 | End: 2024-02-04

## 2024-02-04 RX ADMIN — TORSEMIDE 20 MG: 20 TABLET ORAL at 19:53

## 2024-02-04 RX ADMIN — SOTALOL HYDROCHLORIDE 80 MG: 80 TABLET ORAL at 19:53

## 2024-02-04 RX ADMIN — GABAPENTIN 100 MG: 100 CAPSULE ORAL at 20:01

## 2024-02-04 RX ADMIN — CEFEPIME 2000 MG: 2 INJECTION, POWDER, FOR SOLUTION INTRAVENOUS at 20:00

## 2024-02-04 RX ADMIN — PRAMIPEXOLE DIHYDROCHLORIDE 0.5 MG: 0.25 TABLET ORAL at 21:23

## 2024-02-04 RX ADMIN — VANCOMYCIN 1500 MG: 1.5 INJECTION, SOLUTION INTRAVENOUS at 19:46

## 2024-02-04 RX ADMIN — LISINOPRIL 2.5 MG: 2.5 TABLET ORAL at 19:53

## 2024-02-04 RX ADMIN — AZITHROMYCIN MONOHYDRATE 500 MG: 500 INJECTION, POWDER, LYOPHILIZED, FOR SOLUTION INTRAVENOUS at 14:57

## 2024-02-04 RX ADMIN — SODIUM CHLORIDE, PRESERVATIVE FREE 10 ML: 5 INJECTION INTRAVENOUS at 19:53

## 2024-02-04 RX ADMIN — GUAIFENESIN 600 MG: 600 TABLET ORAL at 19:52

## 2024-02-04 RX ADMIN — MONTELUKAST 10 MG: 10 TABLET, FILM COATED ORAL at 19:53

## 2024-02-04 RX ADMIN — ACETAMINOPHEN 650 MG: 325 TABLET ORAL at 11:57

## 2024-02-04 RX ADMIN — SILDENAFIL 20 MG: 20 TABLET ORAL at 21:23

## 2024-02-04 RX ADMIN — CEFTRIAXONE SODIUM 1000 MG: 1 INJECTION, POWDER, FOR SOLUTION INTRAMUSCULAR; INTRAVENOUS at 14:56

## 2024-02-04 RX ADMIN — LIDOCAINE HYDROCHLORIDE 20 ML: 10; .005 INJECTION, SOLUTION EPIDURAL; INFILTRATION; INTRACAUDAL; PERINEURAL at 17:12

## 2024-02-04 RX ADMIN — Medication 200 MG: at 21:24

## 2024-02-04 RX ADMIN — IPRATROPIUM BROMIDE AND ALBUTEROL SULFATE 1 DOSE: 2.5; .5 SOLUTION RESPIRATORY (INHALATION) at 20:40

## 2024-02-04 ASSESSMENT — PAIN SCALES - GENERAL
PAINLEVEL_OUTOF10: 10
PAINLEVEL_OUTOF10: 0

## 2024-02-04 ASSESSMENT — LIFESTYLE VARIABLES
HOW MANY STANDARD DRINKS CONTAINING ALCOHOL DO YOU HAVE ON A TYPICAL DAY: PATIENT DOES NOT DRINK
HOW OFTEN DO YOU HAVE A DRINK CONTAINING ALCOHOL: NEVER

## 2024-02-04 ASSESSMENT — PAIN DESCRIPTION - PAIN TYPE: TYPE: CHRONIC PAIN

## 2024-02-04 ASSESSMENT — PAIN DESCRIPTION - ORIENTATION: ORIENTATION: RIGHT

## 2024-02-04 ASSESSMENT — PAIN DESCRIPTION - LOCATION: LOCATION: LEG

## 2024-02-04 ASSESSMENT — PAIN DESCRIPTION - DESCRIPTORS: DESCRIPTORS: ACHING

## 2024-02-04 NOTE — ED PROVIDER NOTES
Emergency Department Encounter    Patient: Heidi Wilkerson  MRN: [id number]  : 1940  Date of Evaluation: 2024  ED Provider:  Krysta Collins DO    Triage Chief Complaint:   Fall (Taking care of her dog)    False Pass:  Heidi Wilkerson is a 83 y.o. female with history of chronic respiratory failure peripheral neuropathy anxiety paroxysmal atrial fibrillation hypertension pressure ulcer stage one of the buttock, scoliosis, hypertension that presents to the emergency department via EMS from assisted living facility for fall.  Per EMS patient states she may have tripped over her dog.  In speaking with patient she is a poor historian unable to the medical history of present illness.  Patient states she thinks she was going to the door open her door letting her dog out.  Patient states she does not remember.  Family member at the bedside states this is unlike patient to not remember what happened.  She states it may be due to patient's elevated CO2 level.  She states patient is supposed to be on BiPAP at night but she knows patient has not been wearing it.  She states patient does appear confused and not quite like herself.  She is a patient with no history of dementia or Alzheimer's.  Today patient is definitely a fall risk but uses walker and wheelchair usual.  States patient on baby aspirin daily.  Patient does admit to hitting her head during the fall and her left leg.  Patient complains of neuropathy and pain in her right leg and needs her medication.  Patient here for evaluation.    ROS - see HPI, below listed is current ROS at time of my eval:  General:  No fevers, no chills, no weakness  Eyes:  No recent vison changes, no discharge  ENT:  No sore throat, no nasal congestion, no hearing changes  Cardiovascular:  No chest pain, no palpitations  Respiratory:  No shortness of breath, no cough, no wheezing  Gastrointestinal:  No pain, no nausea, no vomiting, no diarrhea  Musculoskeletal:  No muscle pain,

## 2024-02-04 NOTE — H&P
prolapse     SANDRA (obstructive sleep apnea)     Paroxysmal atrial fibrillation (HCC)     Peripheral neuropathy 01/21/2020    Restrictive lung disease     chronic home O2 - 2 liters NC    Scoliosis      PSHX:  has a past surgical history that includes Hysterectomy and eye surgery (Bilateral).  Allergies:   Allergies   Allergen Reactions    Nsaids     Aspirin Other (See Comments)     Takes lower dose now    Duloxetine Other (See Comments)     Chest tightness    Hydrochlorothiazide      unknown    Hydrochlorothiazide W-Amiloride      Other reaction(s): Other: See Comments  nausea, leg cramps      Iodine      unknown    Meloxicam     Tramadol      Other reaction(s): Other: See Comments  \"feels weird\"       Fam HX:  family history includes Cancer in an other family member.  Soc HX:   Social History     Socioeconomic History    Marital status:      Spouse name: None    Number of children: None    Years of education: None    Highest education level: None   Tobacco Use    Smoking status: Never    Smokeless tobacco: Never   Vaping Use    Vaping Use: Never used   Substance and Sexual Activity    Alcohol use: Yes     Comment: occaisonal wine    Drug use: Never     Social Determinants of Health     Financial Resource Strain: Low Risk  (5/4/2023)    Overall Financial Resource Strain (CARDIA)     Difficulty of Paying Living Expenses: Not very hard   Transportation Needs: Unknown (5/4/2023)    PRAPARE - Transportation     Lack of Transportation (Non-Medical): No   Physical Activity: Insufficiently Active (9/9/2022)    Exercise Vital Sign     Days of Exercise per Week: 7 days     Minutes of Exercise per Session: 10 min   Housing Stability: Unknown (5/4/2023)    Housing Stability Vital Sign     Unstable Housing in the Last Year: No       Medications:   Medications:    gabapentin  100 mg Oral Once    lidocaine-EPINEPHrine  20 mL IntraDERmal Once    sodium chloride flush  5-40 mL IntraVENous 2 times per day    enoxaparin  40 mg

## 2024-02-05 LAB
ANION GAP SERPL CALCULATED.3IONS-SCNC: 7 MMOL/L (ref 7–16)
BASE EXCESS MIXED: 10.6 (ref 0–3)
BASE EXCESS: ABNORMAL (ref 0–3)
BUN SERPL-MCNC: 22 MG/DL (ref 6–23)
CALCIUM SERPL-MCNC: 8.4 MG/DL (ref 8.3–10.6)
CHLORIDE BLD-SCNC: 96 MMOL/L (ref 99–110)
CO2 CONTENT: 39.8 MMOL/L (ref 21–32)
CO2: 36 MMOL/L (ref 21–32)
CREAT SERPL-MCNC: 0.6 MG/DL (ref 0.6–1.1)
EKG ATRIAL RATE: 64 BPM
EKG DIAGNOSIS: NORMAL
EKG P AXIS: 49 DEGREES
EKG P-R INTERVAL: 194 MS
EKG Q-T INTERVAL: 422 MS
EKG QRS DURATION: 80 MS
EKG QTC CALCULATION (BAZETT): 435 MS
EKG R AXIS: -14 DEGREES
EKG T AXIS: 19 DEGREES
EKG VENTRICULAR RATE: 64 BPM
GFR SERPL CREATININE-BSD FRML MDRD: >60 ML/MIN/1.73M2
GLUCOSE SERPL-MCNC: 88 MG/DL (ref 70–99)
HCO3 ARTERIAL: 37.8 MMOL/L (ref 21–28)
HCT VFR BLD CALC: 28.7 % (ref 37–47)
HEMOGLOBIN: 8.6 GM/DL (ref 12.5–16)
L PNEUMO AG UR QL IA: NEGATIVE
MAGNESIUM: 2.2 MG/DL (ref 1.8–2.4)
MCH RBC QN AUTO: 32 PG (ref 27–31)
MCHC RBC AUTO-ENTMCNC: 30 % (ref 32–36)
MCV RBC AUTO: 106.7 FL (ref 78–100)
O2 SATURATION: 86.4 % (ref 94–98)
PCO2 ARTERIAL: 65.4 MMHG (ref 35–48)
PDW BLD-RTO: 13.4 % (ref 11.7–14.9)
PH BLOOD: 7.37 (ref 7.35–7.45)
PHOSPHORUS: 3.2 MG/DL (ref 2.5–4.9)
PLATELET # BLD: 130 K/CU MM (ref 140–440)
PMV BLD AUTO: 9.2 FL (ref 7.5–11.1)
PO2 ARTERIAL: 55.8 MMHG (ref 83–108)
POTASSIUM SERPL-SCNC: 4 MMOL/L (ref 3.5–5.1)
PROCALCITONIN SERPL-MCNC: 0.06 NG/ML
RBC # BLD: 2.69 M/CU MM (ref 4.2–5.4)
REASON FOR REJECTION: NORMAL
REJECTED TEST: NORMAL
S PNEUM AG CSF QL: NORMAL
SODIUM BLD-SCNC: 139 MMOL/L (ref 135–145)
SOURCE, BLOOD GAS: ABNORMAL
VANCOMYCIN RANDOM: 15 UG/ML
WBC # BLD: 4.3 K/CU MM (ref 4–10.5)

## 2024-02-05 PROCEDURE — 85027 COMPLETE CBC AUTOMATED: CPT

## 2024-02-05 PROCEDURE — 0HQLXZZ REPAIR LEFT LOWER LEG SKIN, EXTERNAL APPROACH: ICD-10-PCS | Performed by: EMERGENCY MEDICINE

## 2024-02-05 PROCEDURE — 97530 THERAPEUTIC ACTIVITIES: CPT

## 2024-02-05 PROCEDURE — 36600 WITHDRAWAL OF ARTERIAL BLOOD: CPT

## 2024-02-05 PROCEDURE — 2580000003 HC RX 258: Performed by: NURSE PRACTITIONER

## 2024-02-05 PROCEDURE — 84100 ASSAY OF PHOSPHORUS: CPT

## 2024-02-05 PROCEDURE — 80202 ASSAY OF VANCOMYCIN: CPT

## 2024-02-05 PROCEDURE — 36415 COLL VENOUS BLD VENIPUNCTURE: CPT

## 2024-02-05 PROCEDURE — 87899 AGENT NOS ASSAY W/OPTIC: CPT

## 2024-02-05 PROCEDURE — 87081 CULTURE SCREEN ONLY: CPT

## 2024-02-05 PROCEDURE — 1200000000 HC SEMI PRIVATE

## 2024-02-05 PROCEDURE — 6370000000 HC RX 637 (ALT 250 FOR IP): Performed by: NURSE PRACTITIONER

## 2024-02-05 PROCEDURE — 6360000002 HC RX W HCPCS: Performed by: NURSE PRACTITIONER

## 2024-02-05 PROCEDURE — 93010 ELECTROCARDIOGRAM REPORT: CPT | Performed by: INTERNAL MEDICINE

## 2024-02-05 PROCEDURE — 94761 N-INVAS EAR/PLS OXIMETRY MLT: CPT

## 2024-02-05 PROCEDURE — 94640 AIRWAY INHALATION TREATMENT: CPT

## 2024-02-05 PROCEDURE — 87449 NOS EACH ORGANISM AG IA: CPT

## 2024-02-05 PROCEDURE — 82803 BLOOD GASES ANY COMBINATION: CPT

## 2024-02-05 PROCEDURE — 6370000000 HC RX 637 (ALT 250 FOR IP): Performed by: INTERNAL MEDICINE

## 2024-02-05 PROCEDURE — 97162 PT EVAL MOD COMPLEX 30 MIN: CPT

## 2024-02-05 PROCEDURE — 80048 BASIC METABOLIC PNL TOTAL CA: CPT

## 2024-02-05 PROCEDURE — 87641 MR-STAPH DNA AMP PROBE: CPT

## 2024-02-05 PROCEDURE — 83735 ASSAY OF MAGNESIUM: CPT

## 2024-02-05 PROCEDURE — 94660 CPAP INITIATION&MGMT: CPT

## 2024-02-05 PROCEDURE — 2700000000 HC OXYGEN THERAPY PER DAY

## 2024-02-05 RX ORDER — GABAPENTIN 100 MG/1
100 CAPSULE ORAL 2 TIMES DAILY
Status: DISCONTINUED | OUTPATIENT
Start: 2024-02-05 | End: 2024-02-07 | Stop reason: HOSPADM

## 2024-02-05 RX ORDER — MONTELUKAST SODIUM 10 MG/1
10 TABLET ORAL NIGHTLY
Status: DISCONTINUED | OUTPATIENT
Start: 2024-02-06 | End: 2024-02-07 | Stop reason: HOSPADM

## 2024-02-05 RX ORDER — VANCOMYCIN 1.75 G/350ML
1250 INJECTION, SOLUTION INTRAVENOUS EVERY 24 HOURS
Status: DISCONTINUED | OUTPATIENT
Start: 2024-02-05 | End: 2024-02-06

## 2024-02-05 RX ADMIN — PRAMIPEXOLE DIHYDROCHLORIDE 0.12 MG: 0.25 TABLET ORAL at 08:52

## 2024-02-05 RX ADMIN — SILDENAFIL 20 MG: 20 TABLET ORAL at 20:55

## 2024-02-05 RX ADMIN — PANTOPRAZOLE SODIUM 40 MG: 40 TABLET, DELAYED RELEASE ORAL at 05:39

## 2024-02-05 RX ADMIN — VANCOMYCIN 1250 MG: 1.75 INJECTION, SOLUTION INTRAVENOUS at 20:52

## 2024-02-05 RX ADMIN — GABAPENTIN 100 MG: 100 CAPSULE ORAL at 20:55

## 2024-02-05 RX ADMIN — PRAMIPEXOLE DIHYDROCHLORIDE 0.5 MG: 0.25 TABLET ORAL at 20:53

## 2024-02-05 RX ADMIN — Medication 200 MG: at 08:51

## 2024-02-05 RX ADMIN — LISINOPRIL 2.5 MG: 2.5 TABLET ORAL at 20:55

## 2024-02-05 RX ADMIN — CEFEPIME 2000 MG: 2 INJECTION, POWDER, FOR SOLUTION INTRAVENOUS at 20:51

## 2024-02-05 RX ADMIN — SODIUM CHLORIDE, PRESERVATIVE FREE 10 ML: 5 INJECTION INTRAVENOUS at 20:53

## 2024-02-05 RX ADMIN — MONTELUKAST 10 MG: 10 TABLET, FILM COATED ORAL at 08:52

## 2024-02-05 RX ADMIN — IPRATROPIUM BROMIDE AND ALBUTEROL SULFATE 1 DOSE: 2.5; .5 SOLUTION RESPIRATORY (INHALATION) at 11:16

## 2024-02-05 RX ADMIN — ACETAMINOPHEN 650 MG: 325 TABLET ORAL at 17:28

## 2024-02-05 RX ADMIN — CEFEPIME 2000 MG: 2 INJECTION, POWDER, FOR SOLUTION INTRAVENOUS at 09:07

## 2024-02-05 RX ADMIN — SOTALOL HYDROCHLORIDE 80 MG: 80 TABLET ORAL at 08:51

## 2024-02-05 RX ADMIN — SODIUM CHLORIDE: 9 INJECTION, SOLUTION INTRAVENOUS at 20:50

## 2024-02-05 RX ADMIN — GUAIFENESIN 600 MG: 600 TABLET ORAL at 08:52

## 2024-02-05 RX ADMIN — GUAIFENESIN 600 MG: 600 TABLET ORAL at 20:53

## 2024-02-05 RX ADMIN — GABAPENTIN 100 MG: 100 CAPSULE ORAL at 14:33

## 2024-02-05 RX ADMIN — IPRATROPIUM BROMIDE AND ALBUTEROL SULFATE 1 DOSE: 2.5; .5 SOLUTION RESPIRATORY (INHALATION) at 19:19

## 2024-02-05 RX ADMIN — SOTALOL HYDROCHLORIDE 80 MG: 80 TABLET ORAL at 20:55

## 2024-02-05 RX ADMIN — IPRATROPIUM BROMIDE AND ALBUTEROL SULFATE 1 DOSE: 2.5; .5 SOLUTION RESPIRATORY (INHALATION) at 07:26

## 2024-02-05 RX ADMIN — IPRATROPIUM BROMIDE AND ALBUTEROL SULFATE 1 DOSE: 2.5; .5 SOLUTION RESPIRATORY (INHALATION) at 15:11

## 2024-02-05 RX ADMIN — ACETAMINOPHEN 650 MG: 325 TABLET ORAL at 09:22

## 2024-02-05 RX ADMIN — PRAMIPEXOLE DIHYDROCHLORIDE 0.12 MG: 0.25 TABLET ORAL at 17:24

## 2024-02-05 RX ADMIN — ACETAMINOPHEN 650 MG: 325 TABLET ORAL at 01:51

## 2024-02-05 RX ADMIN — SILDENAFIL 20 MG: 20 TABLET ORAL at 09:03

## 2024-02-05 RX ADMIN — TORSEMIDE 20 MG: 20 TABLET ORAL at 08:52

## 2024-02-05 RX ADMIN — LISINOPRIL 2.5 MG: 2.5 TABLET ORAL at 08:52

## 2024-02-05 RX ADMIN — SODIUM CHLORIDE, PRESERVATIVE FREE 10 ML: 5 INJECTION INTRAVENOUS at 09:03

## 2024-02-05 RX ADMIN — SILDENAFIL 20 MG: 20 TABLET ORAL at 14:33

## 2024-02-05 RX ADMIN — ENOXAPARIN SODIUM 40 MG: 100 INJECTION SUBCUTANEOUS at 08:51

## 2024-02-05 ASSESSMENT — PAIN SCALES - GENERAL
PAINLEVEL_OUTOF10: 0
PAINLEVEL_OUTOF10: 8
PAINLEVEL_OUTOF10: 3
PAINLEVEL_OUTOF10: 0
PAINLEVEL_OUTOF10: 0
PAINLEVEL_OUTOF10: 10
PAINLEVEL_OUTOF10: 0
PAINLEVEL_OUTOF10: 5
PAINLEVEL_OUTOF10: 7
PAINLEVEL_OUTOF10: 8

## 2024-02-05 ASSESSMENT — PAIN - FUNCTIONAL ASSESSMENT
PAIN_FUNCTIONAL_ASSESSMENT: ACTIVITIES ARE NOT PREVENTED

## 2024-02-05 ASSESSMENT — PAIN DESCRIPTION - LOCATION
LOCATION: LEG
LOCATION: GENERALIZED;LEG

## 2024-02-05 ASSESSMENT — PAIN DESCRIPTION - ONSET
ONSET: GRADUAL
ONSET: ON-GOING

## 2024-02-05 ASSESSMENT — PAIN DESCRIPTION - DESCRIPTORS
DESCRIPTORS: ACHING
DESCRIPTORS: SHOOTING
DESCRIPTORS: ACHING
DESCRIPTORS: ACHING

## 2024-02-05 ASSESSMENT — PAIN DESCRIPTION - ORIENTATION
ORIENTATION: RIGHT
ORIENTATION: RIGHT;LEFT

## 2024-02-05 ASSESSMENT — PAIN SCALES - WONG BAKER: WONGBAKER_NUMERICALRESPONSE: 0

## 2024-02-05 ASSESSMENT — PAIN DESCRIPTION - FREQUENCY
FREQUENCY: INTERMITTENT

## 2024-02-05 ASSESSMENT — PAIN DESCRIPTION - PAIN TYPE
TYPE: CHRONIC PAIN

## 2024-02-05 NOTE — CARE COORDINATION
02/05/24 0958   Service Assessment   Patient Orientation Alert and Oriented;Person;Place;Situation   Cognition Alert   History Provided By Patient   Primary Caregiver Other (Comment)  (pt lives in an Assisted Living. [facility].)   Support Systems Children;Other (Comment)  (lives in an assisted living)   Patient's Healthcare Decision Maker is: Legal Next of Kin   PCP Verified by CM Yes   Last Visit to PCP Within last year   Prior Functional Level Assistance with the following:;Mobility;Shopping;Housework;Cooking;Bathing   Current Functional Level Assistance with the following:;Mobility;Bathing;Cooking;Housework   Can patient return to prior living arrangement Yes   Ability to make needs known: Good   Family able to assist with home care needs: Other (comment)  (lives in Assisted Living)     CM into see pt to initiate a safe discharge plan. Cm introduced self and explained role of CM. Pt is kind, alert and oriented. Pt lives at the [facility]. Pt has a dtr that works locally as additional support. Pt uses home O2 at @ lmin 24/7 supplied through the [employer]. Pt has bipap at . Pt shared DME includes, walker,w/c,shower chr. Pt also has grab bars and apartment equipped with emergency call system. Pt goes to the dining room for meals per her w/c. Pt has a PCP and insurance. Pt shared she plans to return back to [facility] when discharged. CM called Dtr and left a VM to continue to discuss discharge planning.   Discharge plan is for pt to return to the [facility]. Pt has all needed DME. Pt has PCP and insurance.   CM provided card and encouraged to call for any needs or concern.   CM is available if any needs arise.   7313 CM received call back from Ita. CM will meet with pts dtr today at 130 in pts room.

## 2024-02-05 NOTE — CARE COORDINATION
CM discussed with Darling NP regarding PT/OT evals when ever medically ready. Pt may need higher level of care than an Assisted living.   1330 CM met with pts dtr. Dtr voiced concerns about pt poss needing more care than the Assisted living can provide at this time. CM informed dtr and updated her that CM spoke with NP and requested PT/OT evals. CM discussed options pending the evals for Swing bed. SNF vs home care. Dtr would first like Swing bed as a first option if evals indicated rehab. CM team will continue to follow for discharge planning

## 2024-02-05 NOTE — PLAN OF CARE
Problem: Safety - Adult  Goal: Free from fall injury  2/5/2024 1044 by Ines Mccarty RN  Outcome: Progressing  2/4/2024 2113 by Kimi Alvarado RN  Outcome: Progressing     Problem: Skin/Tissue Integrity  Goal: Absence of new skin breakdown  Description: 1.  Monitor for areas of redness and/or skin breakdown  2.  Assess vascular access sites hourly  3.  Every 4-6 hours minimum:  Change oxygen saturation probe site  4.  Every 4-6 hours:  If on nasal continuous positive airway pressure, respiratory therapy assess nares and determine need for appliance change or resting period.  2/5/2024 1044 by Ines Mccarty RN  Outcome: Progressing  2/4/2024 2113 by Kimi Alvarado RN  Outcome: Progressing     Problem: Pain  Goal: Verbalizes/displays adequate comfort level or baseline comfort level  Outcome: Progressing     Problem: Chronic Conditions and Co-morbidities  Goal: Patient's chronic conditions and co-morbidity symptoms are monitored and maintained or improved  Outcome: Progressing     Problem: Discharge Planning  Goal: Discharge to home or other facility with appropriate resources  Outcome: Progressing     Problem: ABCDS Injury Assessment  Goal: Absence of physical injury  Outcome: Progressing

## 2024-02-06 LAB
AMMONIA: 34 UMOL/L (ref 11–51)
ANION GAP SERPL CALCULATED.3IONS-SCNC: 9 MMOL/L (ref 7–16)
BASE EXCESS MIXED: 10.4 (ref 0–2)
BUN SERPL-MCNC: 23 MG/DL (ref 6–23)
CALCIUM SERPL-MCNC: 8.6 MG/DL (ref 8.3–10.6)
CHLORIDE BLD-SCNC: 96 MMOL/L (ref 99–110)
CO2: 33 MMOL/L (ref 21–32)
CREAT SERPL-MCNC: 0.6 MG/DL (ref 0.6–1.1)
FOLATE SERPL-MCNC: >20 NG/ML (ref 3.1–17.5)
GFR SERPL CREATININE-BSD FRML MDRD: >60 ML/MIN/1.73M2
GLUCOSE SERPL-MCNC: 92 MG/DL (ref 70–99)
HCO3 VENOUS: 37.8 MMOL/L (ref 22–29)
HCT VFR BLD CALC: 30 % (ref 37–47)
HEMOGLOBIN: 9 GM/DL (ref 12.5–16)
MAGNESIUM: 2.2 MG/DL (ref 1.8–2.4)
MCH RBC QN AUTO: 32 PG (ref 27–31)
MCHC RBC AUTO-ENTMCNC: 30 % (ref 32–36)
MCV RBC AUTO: 106.8 FL (ref 78–100)
MRSA, DNA, NASAL: NEGATIVE
O2 SAT, VEN: 67.3 % (ref 50–70)
PCO2, VEN: 66.5 MMHG (ref 41–51)
PDW BLD-RTO: 13.6 % (ref 11.7–14.9)
PH VENOUS: 7.36 (ref 7.32–7.43)
PHOSPHORUS: 3.1 MG/DL (ref 2.5–4.9)
PLATELET # BLD: 157 K/CU MM (ref 140–440)
PMV BLD AUTO: 9.5 FL (ref 7.5–11.1)
PO2, VEN: 37.9 MMHG (ref 28–48)
POTASSIUM SERPL-SCNC: 3.9 MMOL/L (ref 3.5–5.1)
RBC # BLD: 2.81 M/CU MM (ref 4.2–5.4)
SODIUM BLD-SCNC: 138 MMOL/L (ref 135–145)
SPECIMEN DESCRIPTION: NORMAL
TSH SERPL DL<=0.005 MIU/L-ACNC: 1.12 UIU/ML (ref 0.27–4.2)
VITAMIN B-12: 537.9 PG/ML (ref 211–911)
WBC # BLD: 4.5 K/CU MM (ref 4–10.5)

## 2024-02-06 PROCEDURE — 1200000000 HC SEMI PRIVATE

## 2024-02-06 PROCEDURE — 84100 ASSAY OF PHOSPHORUS: CPT

## 2024-02-06 PROCEDURE — 80048 BASIC METABOLIC PNL TOTAL CA: CPT

## 2024-02-06 PROCEDURE — 6370000000 HC RX 637 (ALT 250 FOR IP): Performed by: INTERNAL MEDICINE

## 2024-02-06 PROCEDURE — 6370000000 HC RX 637 (ALT 250 FOR IP): Performed by: PHYSICIAN ASSISTANT

## 2024-02-06 PROCEDURE — 94761 N-INVAS EAR/PLS OXIMETRY MLT: CPT

## 2024-02-06 PROCEDURE — 85027 COMPLETE CBC AUTOMATED: CPT

## 2024-02-06 PROCEDURE — 83735 ASSAY OF MAGNESIUM: CPT

## 2024-02-06 PROCEDURE — 36415 COLL VENOUS BLD VENIPUNCTURE: CPT

## 2024-02-06 PROCEDURE — 82800 BLOOD PH: CPT

## 2024-02-06 PROCEDURE — 94640 AIRWAY INHALATION TREATMENT: CPT

## 2024-02-06 PROCEDURE — 6370000000 HC RX 637 (ALT 250 FOR IP): Performed by: NURSE PRACTITIONER

## 2024-02-06 PROCEDURE — 97166 OT EVAL MOD COMPLEX 45 MIN: CPT

## 2024-02-06 PROCEDURE — 2700000000 HC OXYGEN THERAPY PER DAY

## 2024-02-06 PROCEDURE — 82805 BLOOD GASES W/O2 SATURATION: CPT

## 2024-02-06 PROCEDURE — 2580000003 HC RX 258: Performed by: NURSE PRACTITIONER

## 2024-02-06 PROCEDURE — 6360000002 HC RX W HCPCS: Performed by: NURSE PRACTITIONER

## 2024-02-06 PROCEDURE — 84443 ASSAY THYROID STIM HORMONE: CPT

## 2024-02-06 PROCEDURE — 82607 VITAMIN B-12: CPT

## 2024-02-06 PROCEDURE — 82140 ASSAY OF AMMONIA: CPT

## 2024-02-06 PROCEDURE — 82746 ASSAY OF FOLIC ACID SERUM: CPT

## 2024-02-06 PROCEDURE — 97530 THERAPEUTIC ACTIVITIES: CPT

## 2024-02-06 RX ORDER — SOTALOL HYDROCHLORIDE 80 MG/1
80 TABLET ORAL 2 TIMES DAILY
COMMUNITY

## 2024-02-06 RX ORDER — LANOLIN ALCOHOL/MO/W.PET/CERES
3 CREAM (GRAM) TOPICAL NIGHTLY
Status: DISCONTINUED | OUTPATIENT
Start: 2024-02-06 | End: 2024-02-07 | Stop reason: HOSPADM

## 2024-02-06 RX ORDER — PRAMIPEXOLE DIHYDROCHLORIDE 0.12 MG/1
0.12 TABLET ORAL EVERY EVENING
Status: DISCONTINUED | OUTPATIENT
Start: 2024-02-06 | End: 2024-02-07 | Stop reason: HOSPADM

## 2024-02-06 RX ORDER — ACETAMINOPHEN 500 MG
1000 TABLET ORAL 2 TIMES DAILY
COMMUNITY

## 2024-02-06 RX ORDER — GABAPENTIN 100 MG/1
100 CAPSULE ORAL 2 TIMES DAILY PRN
COMMUNITY

## 2024-02-06 RX ORDER — PRAMIPEXOLE DIHYDROCHLORIDE 0.12 MG/1
0.12 TABLET ORAL EVERY MORNING
Status: DISCONTINUED | OUTPATIENT
Start: 2024-02-07 | End: 2024-02-07 | Stop reason: HOSPADM

## 2024-02-06 RX ORDER — POTASSIUM CHLORIDE 750 MG/1
10 TABLET, EXTENDED RELEASE ORAL 2 TIMES DAILY
Status: ON HOLD | COMMUNITY
End: 2024-02-06

## 2024-02-06 RX ADMIN — LISINOPRIL 2.5 MG: 2.5 TABLET ORAL at 09:33

## 2024-02-06 RX ADMIN — SOTALOL HYDROCHLORIDE 80 MG: 80 TABLET ORAL at 20:40

## 2024-02-06 RX ADMIN — ENOXAPARIN SODIUM 40 MG: 100 INJECTION SUBCUTANEOUS at 09:34

## 2024-02-06 RX ADMIN — CEFEPIME 2000 MG: 2 INJECTION, POWDER, FOR SOLUTION INTRAVENOUS at 09:41

## 2024-02-06 RX ADMIN — GUAIFENESIN 600 MG: 600 TABLET ORAL at 09:32

## 2024-02-06 RX ADMIN — SODIUM CHLORIDE, PRESERVATIVE FREE 10 ML: 5 INJECTION INTRAVENOUS at 20:38

## 2024-02-06 RX ADMIN — TORSEMIDE 20 MG: 20 TABLET ORAL at 09:32

## 2024-02-06 RX ADMIN — SILDENAFIL 20 MG: 20 TABLET ORAL at 09:33

## 2024-02-06 RX ADMIN — GABAPENTIN 100 MG: 100 CAPSULE ORAL at 20:40

## 2024-02-06 RX ADMIN — MONTELUKAST 10 MG: 10 TABLET, FILM COATED ORAL at 20:40

## 2024-02-06 RX ADMIN — IPRATROPIUM BROMIDE AND ALBUTEROL SULFATE 1 DOSE: 2.5; .5 SOLUTION RESPIRATORY (INHALATION) at 11:12

## 2024-02-06 RX ADMIN — SILDENAFIL 20 MG: 20 TABLET ORAL at 15:15

## 2024-02-06 RX ADMIN — ACETAMINOPHEN 650 MG: 325 TABLET ORAL at 09:49

## 2024-02-06 RX ADMIN — IPRATROPIUM BROMIDE AND ALBUTEROL SULFATE 1 DOSE: 2.5; .5 SOLUTION RESPIRATORY (INHALATION) at 14:53

## 2024-02-06 RX ADMIN — PRAMIPEXOLE DIHYDROCHLORIDE 0.12 MG: 0.12 TABLET ORAL at 17:59

## 2024-02-06 RX ADMIN — GABAPENTIN 100 MG: 100 CAPSULE ORAL at 09:33

## 2024-02-06 RX ADMIN — IPRATROPIUM BROMIDE AND ALBUTEROL SULFATE 1 DOSE: 2.5; .5 SOLUTION RESPIRATORY (INHALATION) at 07:22

## 2024-02-06 RX ADMIN — PRAMIPEXOLE DIHYDROCHLORIDE 0.5 MG: 0.25 TABLET ORAL at 20:40

## 2024-02-06 RX ADMIN — SOTALOL HYDROCHLORIDE 80 MG: 80 TABLET ORAL at 09:33

## 2024-02-06 RX ADMIN — SILDENAFIL 20 MG: 20 TABLET ORAL at 20:40

## 2024-02-06 RX ADMIN — ACETAMINOPHEN 650 MG: 325 TABLET ORAL at 03:43

## 2024-02-06 RX ADMIN — LISINOPRIL 2.5 MG: 2.5 TABLET ORAL at 20:40

## 2024-02-06 RX ADMIN — ACETAMINOPHEN 650 MG: 325 TABLET ORAL at 20:43

## 2024-02-06 RX ADMIN — PANTOPRAZOLE SODIUM 40 MG: 40 TABLET, DELAYED RELEASE ORAL at 05:20

## 2024-02-06 RX ADMIN — Medication 200 MG: at 09:33

## 2024-02-06 RX ADMIN — SODIUM CHLORIDE, PRESERVATIVE FREE 10 ML: 5 INJECTION INTRAVENOUS at 09:37

## 2024-02-06 RX ADMIN — IPRATROPIUM BROMIDE AND ALBUTEROL SULFATE 1 DOSE: 2.5; .5 SOLUTION RESPIRATORY (INHALATION) at 19:49

## 2024-02-06 RX ADMIN — PRAMIPEXOLE DIHYDROCHLORIDE 0.12 MG: 0.25 TABLET ORAL at 09:33

## 2024-02-06 RX ADMIN — SODIUM CHLORIDE: 9 INJECTION, SOLUTION INTRAVENOUS at 20:36

## 2024-02-06 RX ADMIN — CEFEPIME 2000 MG: 2 INJECTION, POWDER, FOR SOLUTION INTRAVENOUS at 20:37

## 2024-02-06 RX ADMIN — Medication 3 MG: at 20:40

## 2024-02-06 RX ADMIN — GUAIFENESIN 600 MG: 600 TABLET ORAL at 20:38

## 2024-02-06 ASSESSMENT — PAIN DESCRIPTION - ONSET
ONSET: GRADUAL
ONSET: GRADUAL

## 2024-02-06 ASSESSMENT — PAIN DESCRIPTION - FREQUENCY
FREQUENCY: INTERMITTENT
FREQUENCY: INTERMITTENT

## 2024-02-06 ASSESSMENT — PAIN DESCRIPTION - ORIENTATION
ORIENTATION: RIGHT
ORIENTATION: RIGHT;LEFT
ORIENTATION: RIGHT;LEFT

## 2024-02-06 ASSESSMENT — PAIN DESCRIPTION - LOCATION
LOCATION: LEG
LOCATION: LEG
LOCATION: LEG;HIP

## 2024-02-06 ASSESSMENT — PAIN - FUNCTIONAL ASSESSMENT
PAIN_FUNCTIONAL_ASSESSMENT: ACTIVITIES ARE NOT PREVENTED
PAIN_FUNCTIONAL_ASSESSMENT: ACTIVITIES ARE NOT PREVENTED

## 2024-02-06 ASSESSMENT — PAIN DESCRIPTION - PAIN TYPE
TYPE: CHRONIC PAIN
TYPE: CHRONIC PAIN

## 2024-02-06 ASSESSMENT — PAIN SCALES - GENERAL
PAINLEVEL_OUTOF10: 3
PAINLEVEL_OUTOF10: 1
PAINLEVEL_OUTOF10: 3
PAINLEVEL_OUTOF10: 3
PAINLEVEL_OUTOF10: 0

## 2024-02-06 ASSESSMENT — PAIN DESCRIPTION - DESCRIPTORS
DESCRIPTORS: ACHING;DISCOMFORT
DESCRIPTORS: DISCOMFORT
DESCRIPTORS: ACHING

## 2024-02-06 ASSESSMENT — PAIN DESCRIPTION - DIRECTION: RADIATING_TOWARDS: NO

## 2024-02-06 NOTE — CARE COORDINATION
CM met with the patient's daughter regarding discharge planning.  CM advised that PT/OT recommended SNF placement.  Daughter advised that [facility] would be her first choice and possibly [facility] as a second choice.  CM will follow.     2:16 PM  CM attempted to contact Ellen at [facility] regarding bed availability and possible referral but there was no answer.  CM left a voicemail requesting a return call, CM will follow.     2:35 PM  CM received a return call from Ellen at [facility] who confirmed they have skilled beds available at this time.  Ellen will review the patient's clinical documentation and follow-up with this CM regarding their determination.  CM will follow.

## 2024-02-07 VITALS
SYSTOLIC BLOOD PRESSURE: 120 MMHG | BODY MASS INDEX: 26.35 KG/M2 | DIASTOLIC BLOOD PRESSURE: 106 MMHG | HEIGHT: 62 IN | OXYGEN SATURATION: 96 % | WEIGHT: 143.2 LBS | HEART RATE: 78 BPM | TEMPERATURE: 98.2 F | RESPIRATION RATE: 28 BRPM

## 2024-02-07 LAB
ANION GAP SERPL CALCULATED.3IONS-SCNC: 10 MMOL/L (ref 7–16)
BUN SERPL-MCNC: 20 MG/DL (ref 6–23)
CALCIUM SERPL-MCNC: 8.5 MG/DL (ref 8.3–10.6)
CHLORIDE BLD-SCNC: 99 MMOL/L (ref 99–110)
CO2: 32 MMOL/L (ref 21–32)
CREAT SERPL-MCNC: 0.6 MG/DL (ref 0.6–1.1)
GFR SERPL CREATININE-BSD FRML MDRD: >60 ML/MIN/1.73M2
GLUCOSE SERPL-MCNC: 93 MG/DL (ref 70–99)
HCT VFR BLD CALC: 30.3 % (ref 37–47)
HEMOGLOBIN: 8.8 GM/DL (ref 12.5–16)
MAGNESIUM: 2.2 MG/DL (ref 1.8–2.4)
MCH RBC QN AUTO: 32 PG (ref 27–31)
MCHC RBC AUTO-ENTMCNC: 29 % (ref 32–36)
MCV RBC AUTO: 110.2 FL (ref 78–100)
PDW BLD-RTO: 13.9 % (ref 11.7–14.9)
PHOSPHORUS: 2.6 MG/DL (ref 2.5–4.9)
PLATELET # BLD: 145 K/CU MM (ref 140–440)
PMV BLD AUTO: 9.3 FL (ref 7.5–11.1)
POTASSIUM SERPL-SCNC: 3.9 MMOL/L (ref 3.5–5.1)
RBC # BLD: 2.75 M/CU MM (ref 4.2–5.4)
SARS-COV-2 RDRP RESP QL NAA+PROBE: NOT DETECTED
SODIUM BLD-SCNC: 141 MMOL/L (ref 135–145)
WBC # BLD: 3.7 K/CU MM (ref 4–10.5)

## 2024-02-07 PROCEDURE — 83735 ASSAY OF MAGNESIUM: CPT

## 2024-02-07 PROCEDURE — 97530 THERAPEUTIC ACTIVITIES: CPT

## 2024-02-07 PROCEDURE — 85027 COMPLETE CBC AUTOMATED: CPT

## 2024-02-07 PROCEDURE — 94640 AIRWAY INHALATION TREATMENT: CPT

## 2024-02-07 PROCEDURE — 6370000000 HC RX 637 (ALT 250 FOR IP): Performed by: NURSE PRACTITIONER

## 2024-02-07 PROCEDURE — 80048 BASIC METABOLIC PNL TOTAL CA: CPT

## 2024-02-07 PROCEDURE — 6360000002 HC RX W HCPCS: Performed by: EMERGENCY MEDICINE

## 2024-02-07 PROCEDURE — 97110 THERAPEUTIC EXERCISES: CPT

## 2024-02-07 PROCEDURE — 94150 VITAL CAPACITY TEST: CPT

## 2024-02-07 PROCEDURE — 90471 IMMUNIZATION ADMIN: CPT | Performed by: EMERGENCY MEDICINE

## 2024-02-07 PROCEDURE — 2700000000 HC OXYGEN THERAPY PER DAY

## 2024-02-07 PROCEDURE — 94760 N-INVAS EAR/PLS OXIMETRY 1: CPT

## 2024-02-07 PROCEDURE — 90715 TDAP VACCINE 7 YRS/> IM: CPT | Performed by: EMERGENCY MEDICINE

## 2024-02-07 PROCEDURE — 6370000000 HC RX 637 (ALT 250 FOR IP): Performed by: INTERNAL MEDICINE

## 2024-02-07 PROCEDURE — 6370000000 HC RX 637 (ALT 250 FOR IP): Performed by: PHYSICIAN ASSISTANT

## 2024-02-07 PROCEDURE — 36415 COLL VENOUS BLD VENIPUNCTURE: CPT

## 2024-02-07 PROCEDURE — 87635 SARS-COV-2 COVID-19 AMP PRB: CPT

## 2024-02-07 PROCEDURE — 84100 ASSAY OF PHOSPHORUS: CPT

## 2024-02-07 PROCEDURE — 6360000002 HC RX W HCPCS: Performed by: NURSE PRACTITIONER

## 2024-02-07 PROCEDURE — 97116 GAIT TRAINING THERAPY: CPT

## 2024-02-07 RX ORDER — AMOXICILLIN AND CLAVULANATE POTASSIUM 875; 125 MG/1; MG/1
1 TABLET, FILM COATED ORAL EVERY 12 HOURS SCHEDULED
Qty: 9 TABLET | Refills: 0
Start: 2024-02-07 | End: 2024-02-12

## 2024-02-07 RX ORDER — AMOXICILLIN AND CLAVULANATE POTASSIUM 875; 125 MG/1; MG/1
1 TABLET, FILM COATED ORAL EVERY 12 HOURS SCHEDULED
Status: DISCONTINUED | OUTPATIENT
Start: 2024-02-07 | End: 2024-02-07 | Stop reason: HOSPADM

## 2024-02-07 RX ORDER — LANOLIN ALCOHOL/MO/W.PET/CERES
3 CREAM (GRAM) TOPICAL NIGHTLY
Qty: 30 TABLET | Refills: 0
Start: 2024-02-07

## 2024-02-07 RX ADMIN — TORSEMIDE 20 MG: 20 TABLET ORAL at 09:51

## 2024-02-07 RX ADMIN — ENOXAPARIN SODIUM 40 MG: 100 INJECTION SUBCUTANEOUS at 09:51

## 2024-02-07 RX ADMIN — GABAPENTIN 100 MG: 100 CAPSULE ORAL at 09:50

## 2024-02-07 RX ADMIN — Medication 200 MG: at 09:50

## 2024-02-07 RX ADMIN — GUAIFENESIN 600 MG: 600 TABLET ORAL at 09:51

## 2024-02-07 RX ADMIN — SILDENAFIL 20 MG: 20 TABLET ORAL at 14:48

## 2024-02-07 RX ADMIN — IPRATROPIUM BROMIDE AND ALBUTEROL SULFATE 1 DOSE: 2.5; .5 SOLUTION RESPIRATORY (INHALATION) at 11:11

## 2024-02-07 RX ADMIN — IPRATROPIUM BROMIDE AND ALBUTEROL SULFATE 1 DOSE: 2.5; .5 SOLUTION RESPIRATORY (INHALATION) at 07:12

## 2024-02-07 RX ADMIN — TETANUS TOXOID, REDUCED DIPHTHERIA TOXOID AND ACELLULAR PERTUSSIS VACCINE, ADSORBED 0.5 ML: 5; 2.5; 8; 8; 2.5 SUSPENSION INTRAMUSCULAR at 12:43

## 2024-02-07 RX ADMIN — LISINOPRIL 2.5 MG: 2.5 TABLET ORAL at 09:50

## 2024-02-07 RX ADMIN — PRAMIPEXOLE DIHYDROCHLORIDE 0.12 MG: 0.12 TABLET ORAL at 09:51

## 2024-02-07 RX ADMIN — PANTOPRAZOLE SODIUM 40 MG: 40 TABLET, DELAYED RELEASE ORAL at 05:37

## 2024-02-07 RX ADMIN — SOTALOL HYDROCHLORIDE 80 MG: 80 TABLET ORAL at 09:51

## 2024-02-07 RX ADMIN — ACETAMINOPHEN 650 MG: 325 TABLET ORAL at 06:56

## 2024-02-07 RX ADMIN — IPRATROPIUM BROMIDE AND ALBUTEROL SULFATE 1 DOSE: 2.5; .5 SOLUTION RESPIRATORY (INHALATION) at 14:51

## 2024-02-07 RX ADMIN — SILDENAFIL 20 MG: 20 TABLET ORAL at 09:51

## 2024-02-07 RX ADMIN — AMOXICILLIN AND CLAVULANATE POTASSIUM 1 TABLET: 875; 125 TABLET, FILM COATED ORAL at 09:50

## 2024-02-07 ASSESSMENT — PAIN DESCRIPTION - PAIN TYPE: TYPE: ACUTE PAIN

## 2024-02-07 ASSESSMENT — PAIN - FUNCTIONAL ASSESSMENT: PAIN_FUNCTIONAL_ASSESSMENT: ACTIVITIES ARE NOT PREVENTED

## 2024-02-07 ASSESSMENT — PAIN DESCRIPTION - FREQUENCY: FREQUENCY: CONTINUOUS

## 2024-02-07 ASSESSMENT — PAIN DESCRIPTION - ORIENTATION: ORIENTATION: RIGHT;LEFT

## 2024-02-07 ASSESSMENT — PAIN SCALES - GENERAL: PAINLEVEL_OUTOF10: 3

## 2024-02-07 ASSESSMENT — PAIN DESCRIPTION - LOCATION: LOCATION: ABDOMEN;RIB CAGE

## 2024-02-07 ASSESSMENT — PAIN DESCRIPTION - DESCRIPTORS: DESCRIPTORS: ACHING;DISCOMFORT

## 2024-02-07 NOTE — DISCHARGE SUMMARY
V2.0  Discharge Summary    Name:  Heidi Wilkerson /Age/Sex: 1940 (83 y.o. female)   Admit Date: 2024  Discharge Date: 24    MRN & CSN:  [id number] & [id number] Encounter Date and Time 24 1:47 PM EST    Attending:  Ginette Carpenter MD Discharging Provider: Astrid Stokes PA-C       Hospital Course:     Brief HPI: Heidi Wilkerson is a 83 y.o. female who presented with fall.     Problems addressed during this hospitalization:     Acute on chronic respiratory failure with hypoxia and hypercapnia  -Likely secondary to pneumonia superimposed on restrictive lung disease due to kyphoscoliosis. Patient is supposed to wear BiPAP while sleeping, but is frequently noncompliant.   -Patient is now off BiPAP, currently on home 2L NC.   - VBG improved, compensated respiratory acidosis   -Continue management of pneumonia as below  - patient has been compliant with BiPAP the last two nights, continue to encourage compliance     Pneumonia, possibly hospital-acquired  -Recently discharged from [facility]  -CT chest showed small bilateral pleural effusions and mild bibasilar consolidations  -Started on cefepime and vancomycin, vancomycin discontinued as MRSA DNA negative  -Pro-Real low, afebrile, no leukocytosis.  - transition to Augmentin to complete 7-day course     Acute metabolic encephalopathy - improved   -Admit CT head no acute process  - TSH, B12/folate, ammonia WNL  -Likely secondary to hypercapnia, now awake, more oriented.  Still with some periods of confusion which is likely due to hospital acquired delirium  - patient alert and oriented at her baseline on discharge   - continue to encourage BiPAP use to avoid hypercapnia   - scheduled melatonin for sleep/wake cycle     Fall with closed head injury  LLE laceration   -Leg lac repaired in ED  - wound care followed      History of PAF  -Continue sotalol  -Not on AC due to history of falls     Hypertension  -Continue home medication

## 2024-02-07 NOTE — CARE COORDINATION
CM received a voicemail from Ellen at [facility] advising that they are able to accept the patient once deemed medically stable and insurance pre-certification has been obtained.  [facility] will not have a bed available until late afternoon today at the earliest.  CM will follow.      10:30 AM  JEREL notified Bettina at [facility] that the patient's insurance pre-cert could be initiated today for admission to [facility].  CM will follow.     10:36 AM  CM spoke with the patient's daughter Ita on the telephone to notify her that [facility] has agreed to accept the patient once the insurance pre-cert can be obtained.  CM will follow.     10:44 AM   PASRR completed online.     10:51 AM  JEREL notified by Bettina that the patient's insurance pre-cert has been approved.  CM will follow.      12:19 PM  CM received a call from Ellen at [facility] who advised that a bed will be available at 6 PM today.  JEREL spoke with Josefina with [facility], medical transportation to [facility] will be scheduled at 6 PM.  CM will follow.

## 2024-02-07 NOTE — DISCHARGE INSTR - COC
pneumonia.     Physician Certification: I certify the above information and transfer of Heidi Wilkerson  is necessary for the continuing treatment of the diagnosis listed and that she requires Skilled Nursing Facility for greater 30 days.     Update Admission H&P: No change in H&P    PHYSICIAN SIGNATURE:  Electronically signed by Astrid Stokes PA-C on 2/7/24 at 10:57 AM EST

## 2024-02-07 NOTE — DISCHARGE INSTRUCTIONS
Wound care: Left distal and proximal lower leg cleanse with NS, apply Versatel (Versatel can stay up to 7 days), abd, kerlix, ACE change outer dressing daily and prn.

## 2024-02-07 NOTE — CARE COORDINATION
Precert initiated via [employer]: APPROVED  [employer] Auth ID [id number]  02/07/2024-02/09/2024Approved

## 2024-02-07 NOTE — FLOWSHEET NOTE
Physical Therapy Daily Treatment Note   Date: 2024 Room: [address] [place] [address]    Name: Heidi Wilkerson : 1940   MRN: [id number] Admission Date:2024      Restrictions/Precautions:   Fall risk, O2    Initial Pain level: not rated    Subjective/Observation: Patient in bed, with O2 and purewick. States \"I know I need to get out of bed to get better\"    Communication with other providers: cotx with OT for pt tolerance      Therapeutic Activities/Neuro Re-Education/Gait Training   Date: 24   Date:  Date:  Date:    Bed   Mobility SUP with HOB elevated supine >sit        STS   Transfer CGA w/ RW EOB        Stand   Pivot   Transfer EOB>commode with RW and CGA. Assist for line mgmt      Commode Transfer Mod A STS to/from low commode to RW. Cues for fwd wt shift with good carryover      Sitting  Balance   Good        Standing Balance   CGA with RW        Ambulation 30ft 2x with RW and CGA, forward flexed/kyphotic posture. Dec step length/ height and hunter        Stairs   x          Therapeutic Exercises   Date: 24   Date:  Date:  Date:   Seated Marches   1x10 BLE      LAQ       Heel raises           Education provided:       Treatment/Activity Tolerance:   [x] Patient limited by fatigue   [] Patient limited by pain   [] Patient limited by other medical complications   [x] Patient tolerated therapy well  [] Other:     Safety Precautions:     [] Left in bed    [x] Left in chair   [x] Call light within reach    [] Bed alarm on    [x] Personal alarm on    [] Other staff present:  [] Family/Caregiver present:      Post Tx Pain Rating:  none reported     Social/Functional History:  Lives With: Other (comment) (Assisted Living at [facility])  Type of Home: Assisted living  Home Layout: One level  Home Access: Level entry          PT Equipment:  Home Equipment: Lift chair, Walker, rolling, Rollator (Pt has a transport chair she uses when going long distances.  In her apt she uses a 4WW)    Evaluation Assessment

## 2024-02-07 NOTE — PROGRESS NOTES
V2.0    [facility] Progress Note      Name:  Heidi Wilkerson /Age/Sex: 1940  (83 y.o. female)   MRN & CSN:  [id number] & [id number] Encounter Date/Time: 2024 8:03 AM EST   Location:   PCP: Kiya Traylor APRN - NP     Attending:Lorenzo Thornton MD       Hospital Day: 2    Assessment and Recommendations   Heidi Wilkerson is a 83 y.o. female with pmh  who presents with Acute on chronic respiratory failure with hypoxia and hypercapnia (HCC)      Plan:   Acute on chronic respiratory failure with hypoxia and hypercapnia possibly secondary to pneumonia, likely secondary to restrictive lung disease due to kyphoscoliosis and patient is noncompliant with oxygen settings.  Pt is now off of BiPAP and tolerating.  Her oxygen is not been increased over 3 L as per her pulmonology.  She is a CO2 retainer, PCO2 is now down to 65.4 she is awake alert and oriented.  Pneumonia, patient was just recently discharged from [facility], where she had been treated for acute on chronic respiratory failure with hypoxia and hypercapnia.  Given she was recently discharged on 1/15/2024 continue vancomycin and cefepime. Urine Legionella urine strep both negative, MRSA screen, and sputum culture are still pending.  Acute metabolic encephalopathy, resolved, most likely secondary to CO2 retention.  Now off of BiPAP patient is awake alert and oriented pCO2 is 65.4.    Fall with closed head injury and leg laceration left lower extremity.  CT head completed no acute processes.  Leg laceration repaired dressing clean dry and intact  History of PAF, no EKG was completed in the emergency department.  EKG completed now and shows sinus rhythm.  Continue sotalol, patient is not currently on any anticoagulation most likely secondary to falls  Hypertension, continue current home medication  History of SANDRA CPAP noncompliance  History of restrictive lung disease is on chronic O2 at 2 L      Diet Diet NPO Exceptions are: 
 Instructed patient on IS., Only able to get volume up to 500 to 1000.  Instructed to do every hour x 10 breaths   
4 Eyes Skin Assessment     NAME:  Heidi Wilkerson  YOB: 1940  MEDICAL RECORD NUMBER:  [id number]    The patient is being assessed for  Admission    I agree that at least one RN has performed a thorough Head to Toe Skin Assessment on the patient. ALL assessment sites listed below have been assessed.      Areas assessed by both nurses:    Head, Face, Ears, Shoulders, Back, Chest, Arms, Elbows, Hands, Sacrum. Buttock, Coccyx, Ischium, and Legs. Feet and Heels        Does the Patient have a Wound? Yes wound(s) were present on assessment. LDA wound assessment was Initiated and completed by RN       Juliocesar Prevention initiated by RN: No  Wound Care Orders initiated by RN: No    Pressure Injury (Stage 3,4, Unstageable, DTI, NWPT, and Complex wounds) if present, place Wound referral order by RN under : No    New Ostomies, if present place, Ostomy referral order under : No     Nurse 1 eSignature: Electronically signed by Manuel Alejandro RN on 2/4/24 at 7:33 PM EST    **SHARE this note so that the co-signing nurse can place an eSignature**    Nurse 2 eSignature: Electronically signed by Jhoana Winters RN on 2/4/24 at 7:53 PM EST   
Facility/Department: [facility]  Physical Therapy Initial Assessment    Name Heidi Wilkerson    1940   MRN [id number]   Date of Service 2024   Patient Room  -     Patient Diagnoses The primary encounter diagnosis was Acute respiratory failure with hypoxia and hypercapnia (HCC). Diagnoses of Fall, initial encounter, Closed head injury, initial encounter, Leg laceration, left, initial encounter, and Pneumonia of both lower lobes due to infectious organism were also pertinent to this visit.   Past Medical History  has a past medical history of Anxiety, H/O chronic respiratory failure, High risk medication use, HTN (hypertension), Mitral valve prolapse, SANDRA (obstructive sleep apnea), Paroxysmal atrial fibrillation (HCC), Peripheral neuropathy, Restrictive lung disease, and Scoliosis.   Past Surgical History  has a past surgical history that includes Hysterectomy and eye surgery (Bilateral).       Discharge Recommendations  Skilled Nursing Facility  Equipment: Continue to assess    Assessment  Conditions Requiring Skilled Therapeutic Intervention: Decreased functional mobility, Decreased strength, Decreased endurance, Decreased ADL status, Decreased high level ADLs/IADLs, Impaired safety awareness, Impaired cognition, and Increased pain  Assessment of Evaluation: Patient is a 83 y.o. female who presents to [facility] with acute respiratory failure with hypoxia and hypercapnia. Patient would benefit from continued skilled physical therapy services to address decreased strength, endurance, impaired balance, and decline in functional mobility.   Treatment Diagnosis: Muscle weakness (generalized) and History of falls, at risk for falls  Therapy Prognosis: Good  Decision Making: Medium Complexity  Requires PT Follow-Up: Yes  Activity Tolerance: Patient tolerated evaluation without incident, Patient tolerated treatment well, Patient limited by fatigue, and Patient limited by endurance     Plan  General 
Occupational Therapy    Occupational Therapy Treatment Note  Name: Heidi Wilkerson MRN: [id number] :   1940   Date:  2024   Admission Date: 2024 Room:  [address] [place] [address]     Primary Problem:  acute respiratory failure with hypoxia and hypercapnia     Restrictions/Precautions:  Restrictions/Precautions  Restrictions/Precautions: Fall Risk     Communication with other providers:   Cleared for treatment by , RN.  Co-treat PT for Pt endurance, mobility safety    Subjective:  Patient states:  \"I need to go to the bathroom\"  Pain:   Location, Type, Intensity (0/10 to 10/10):  no pain reported    Objective:    Observation:  Pt awake supine in bed with O2    Treatment, including education:  Transfers  Supine to sit :Stand By Assistance with HOB elevated, bedrail assist  Scooting :Stand By Assistance to EOB  Sit to stand :Contact Guard Assistance from EOB  Stand to sit :Contact Guard Assistance to chair  Toilet:Moderate Assistance x2, sit stand from low toilet w/o rails, Min A stand to sit down on low toilet    Self Care Training:   Activities performed today included the following:    Toileting  Contact Guard Assistance for hermilo care and brief management in standing with RW      Therapeutic Activity Training:   Pt performed functional mobility in room from bed to toilet using RW, CGA.  Pt then trf to ambulate in jack, RW CGA with WC follow for safety ~20' x2, and amb back to room and Pt trf into chair.  Pt demo slow steady gait w/o LOB.       Safety Measures: Gait belt used, Left in bed/up in the recliner, Pull/Bed Alarm activated and call light left in reach    Assessment / Impression:        Patient's tolerance of treatment: Good   Adverse Reaction: None  Significant change in status and impact:  None  Barriers to improvement:  Dec strength and endurance    Plan for Next Session:    Continue with POC.    Time in:  1034  Time out:  1057  Total treatment time:  23  Billed Units: 2 TA    Electronically signed by:  
Occupational Therapy  Facility/Department: Critical access hospital  Occupational Therapy Initial Assessment    Name: Heidi Wilkerson  : 1940  MRN: [id number]  Date of Service: 2024    Discharge Recommendations:  Subacute/Skilled Nursing Facility (Swingbed)  OT Equipment Recommendations  Equipment Needed: No       Patient Diagnosis(es): The primary encounter diagnosis was Acute respiratory failure with hypoxia and hypercapnia (HCC). Diagnoses of Fall, initial encounter, Closed head injury, initial encounter, Leg laceration, left, initial encounter, and Pneumonia of both lower lobes due to infectious organism were also pertinent to this visit.  Past Medical History:  has a past medical history of Anxiety, H/O chronic respiratory failure, High risk medication use, HTN (hypertension), Mitral valve prolapse, SANDRA (obstructive sleep apnea), Paroxysmal atrial fibrillation (HCC), Peripheral neuropathy, Restrictive lung disease, and Scoliosis.  Past Surgical History:  has a past surgical history that includes Hysterectomy and eye surgery (Bilateral).    Treatment Diagnosis: Weakness      Assessment   Performance deficits / Impairments: Decreased functional mobility ;Decreased ADL status;Decreased strength;Decreased balance;Decreased endurance  Assessment: Pt is an 84 yo female admitted to [facility] with acute respiratory failure with hypoxia and hypercapnia.  Pt presents below baseline function.  It is recommended Pt receive Occupational Therapy to improve strength, endurance, functional mobility safety and ability to perform ADLs.  Treatment Diagnosis: Weakness  Prognosis: Good  Decision Making: Medium Complexity  REQUIRES OT FOLLOW-UP: Yes  Activity Tolerance  Activity Tolerance: Patient Tolerated treatment well;Patient limited by fatigue;Patient limited by pain        Plan   Occupational Therapy Plan  Times Per Week: 3x  Times Per Day: Once a day  Current Treatment Recommendations: Strengthening, Balance training, 
PHARMACY VANCOMYCIN MONITORING SERVICE  Pharmacy consulted by Radha Patel CNP for monitoring and adjustment.    Indication for treatment: Vancomycin indication: HAP  Goal trough: Trough Goal: 15-20 mcg/mL  AUC/BIJAN: 400-600    Risk Factors for MRSA Identified:   Hospitalization within the past 90 days, Received IV antibiotics within the past 90 days    Pertinent Laboratory Values:   Temp Readings from Last 3 Encounters:   02/05/24 98.4 °F (36.9 °C) (Oral)   11/16/23 97.4 °F (36.3 °C) (Temporal)   11/09/23 97.2 °F (36.2 °C) (Temporal)     Recent Labs     02/04/24  1230 02/05/24  0600   WBC 6.3 4.3     Recent Labs     02/04/24  1230 02/05/24  0650   BUN 18 22   CREATININE 0.7 0.6     Estimated Creatinine Clearance: 62 mL/min (based on SCr of 0.6 mg/dL).    Intake/Output Summary (Last 24 hours) at 2/5/2024 0919  Last data filed at 2/5/2024 0903  Gross per 24 hour   Intake 497.94 ml   Output 400 ml   Net 97.94 ml     Last Encounter Weight:  Wt Readings from Last 3 Encounters:   02/04/24 60.2 kg (132 lb 12.8 oz)   01/19/24 58.1 kg (128 lb)   11/16/23 58.1 kg (128 lb)     Pertinent Cultures:   Date    Source    Results  2/4   Blood    In process  2/4   Urine Antigens   In process  2/4   MRSA Nasal   In process    Vancomycin level:   TROUGH:  No results for input(s): \"VANCOTROUGH\" in the last 72 hours.  RANDOM:    Recent Labs     02/05/24  0725   VANCORANDOM 15.0       Assessment:  HPI: 84 yo female presenting with acute on chronic respiratory failure with hypoxia and hypercapnia.  Patient is currently on BiPAP, last VBG pH was 7.33 which is actually improved from previous of 7.26. She was just recently discharged from [facility], where she had been treated for acute on chronic respiratory failure with hypoxia and hypercapnia.   Interval History: new start 2/4. Received 1500mg loading dose (25mg/kg) in ED  SCr, BUN, and urine output: stable  Day(s) of therapy: 2 of 7  Vancomycin concentration:   2/5: 15 drawn ~14.5 hours post 
PHARMACY VANCOMYCIN MONITORING SERVICE  Pharmacy consulted by Radha Patel CNP for monitoring and adjustment.    Indication for treatment: Vancomycin indication: HAP  Goal trough: Trough Goal: 15-20 mcg/mL  AUC/BIJAN: 400-600    Risk Factors for MRSA Identified:   Hospitalization within the past 90 days, Received IV antibiotics within the past 90 days    Pertinent Laboratory Values:   Temp Readings from Last 3 Encounters:   02/05/24 98.4 °F (36.9 °C) (Oral)   11/16/23 97.4 °F (36.3 °C) (Temporal)   11/09/23 97.2 °F (36.2 °C) (Temporal)     Recent Labs     02/04/24  1230 02/05/24  0600 02/06/24  0527   WBC 6.3 4.3 4.5     Recent Labs     02/04/24  1230 02/05/24  0650 02/06/24  0527   BUN 18 22 23   CREATININE 0.7 0.6 0.6     Estimated Creatinine Clearance: 62 mL/min (based on SCr of 0.6 mg/dL).    Intake/Output Summary (Last 24 hours) at 2/6/2024 0746  Last data filed at 2/6/2024 0525  Gross per 24 hour   Intake 1123.49 ml   Output 400 ml   Net 723.49 ml     Last Encounter Weight:  Wt Readings from Last 3 Encounters:   02/04/24 60.2 kg (132 lb 12.8 oz)   01/19/24 58.1 kg (128 lb)   11/16/23 58.1 kg (128 lb)     Pertinent Cultures:   Date    Source    Results  2/4   Blood    In process  2/4   Urine Antigens   Negative  2/4   MRSA Nasal   In process    Vancomycin level:   TROUGH:  No results for input(s): \"VANCOTROUGH\" in the last 72 hours.  RANDOM:    Recent Labs     02/05/24  0725   VANCORANDOM 15.0     Assessment:  HPI: 82 yo female presenting with acute on chronic respiratory failure with hypoxia and hypercapnia.  Patient is currently on BiPAP, last VBG pH was 7.33 which is actually improved from previous of 7.26. She was just recently discharged from [facility], where she had been treated for acute on chronic respiratory failure with hypoxia and hypercapnia.   Interval History: new start 2/4. Received 1500mg loading dose (25mg/kg) in ED  SCr, BUN, and urine output: stable  Day(s) of therapy: 3 of 7  Vancomycin 
PHARMACY VANCOMYCIN MONITORING SERVICE  Pharmacy consulted by Radha Patel for monitoring and adjustment.    Indication for treatment: Vancomycin indication: HAP  Goal trough: Trough Goal: 15-20 mcg/mL  AUC/BIJAN: 400-600    Risk Factors for MRSA Identified:   Hospitalization within the past 90 days, Received IV antibiotics within the past 90 days    Pertinent Laboratory Values:   Temp Readings from Last 3 Encounters:   02/04/24 98.2 °F (36.8 °C) (Oral)   11/16/23 97.4 °F (36.3 °C) (Temporal)   11/09/23 97.2 °F (36.2 °C) (Temporal)     Recent Labs     02/04/24  1230   WBC 6.3     Recent Labs     02/04/24  1230   BUN 18   CREATININE 0.7     Estimated Creatinine Clearance: 49 mL/min (based on SCr of 0.7 mg/dL).  No intake or output data in the 24 hours ending 02/04/24 1652  Last Encounter Weight:  Wt Readings from Last 3 Encounters:   02/04/24 57.2 kg (126 lb)   01/19/24 58.1 kg (128 lb)   11/16/23 58.1 kg (128 lb)       [unfilled]     Pertinent Cultures:   Date    Source    Results  2/4   Blood    In process  2/4   MRSA Nasal   ordered      Vancomycin level:   TROUGH:  No results for input(s): \"VANCOTROUGH\" in the last 72 hours.  RANDOM:  No results for input(s): \"VANCORANDOM\" in the last 72 hours.    Assessment:  HPI: 82 yo female presenting with acute on chronic respiratory failure with hypoxia and hypercapnia.  Patient is currently on BiPAP, last VBG pH was 7.33 which is actually improved from previous of 7.26. She was just recently discharged from [facility], where she had been treated for acute on chronic respiratory failure with hypoxia and hypercapnia.   Interval History: new start 2/4. Received 1500mg loading dose (25mg/kg) in ED  SCr, BUN, and urine output: sCr 0.7, baseline appears to be ~0.5, BUN WNL, no UOP data yet  Day(s) of therapy: 1 of 7  Vancomycin concentration: TBD 2/6/24    Plan:  Current Vancomycin Dose: following load dose of 1500mg will initiate vancomycin IVPB 500mg Q12H. Level will be scheduled 
Patient on our V60 bipap mask on the following settings: IPAP 18, EPAP 7, set RR 17 and Fi02 30%.  VS HR 73, RR 20 bpm and Sp02 93%.  Patient has worn bipap mask since 2030 yesterday, except for a couple of short breaks to drink some water.  Patient has been alert and oriented when awake.  RN April aware and RT will follow.  
Pt was discharged from the unit at 1800 with all personal belongings and applicable prescriptions.  [facility] here to transfer patient to [facility].        Electronically signed by Diana Espinosa RN on 2/7/24 at 6:10 PM EST  
Report called to Virginie at [facility].  
Daily      Infusions:    sodium chloride 250 mL/hr at 02/06/24 0940     PRN Meds: sodium chloride flush, 5-40 mL, PRN  sodium chloride, , PRN  polyethylene glycol, 17 g, Daily PRN  acetaminophen, 650 mg, Q6H PRN   Or  acetaminophen, 650 mg, Q6H PRN        Labs      Recent Results (from the past 24 hour(s))   Critical Care Panel    Collection Time: 02/06/24  5:27 AM   Result Value Ref Range    Sodium 138 135 - 145 MMOL/L    Potassium 3.9 3.5 - 5.1 MMOL/L    Chloride 96 (L) 99 - 110 mMol/L    CO2 33 (H) 21 - 32 MMOL/L    Anion Gap 9 7 - 16    Glucose 92 70 - 99 MG/DL    BUN 23 6 - 23 MG/DL    Creatinine 0.6 0.6 - 1.1 MG/DL    Est, Glom Filt Rate >60 >60 mL/min/1.73m2    Calcium 8.6 8.3 - 10.6 MG/DL    Phosphorus 3.1 2.5 - 4.9 MG/DL    Magnesium 2.2 1.8 - 2.4 mg/dl   CBC    Collection Time: 02/06/24  5:27 AM   Result Value Ref Range    WBC 4.5 4.0 - 10.5 K/CU MM    RBC 2.81 (L) 4.2 - 5.4 M/CU MM    Hemoglobin 9.0 (L) 12.5 - 16.0 GM/DL    Hematocrit 30.0 (L) 37 - 47 %    .8 (H) 78 - 100 FL    MCH 32.0 (H) 27 - 31 PG    MCHC 30.0 (L) 32.0 - 36.0 %    RDW 13.6 11.7 - 14.9 %    Platelets 157 140 - 440 K/CU MM    MPV 9.5 7.5 - 11.1 FL        Imaging/Diagnostics Last 24 Hours   CT CHEST WO CONTRAST    Result Date: 2/4/2024  EXAMINATION: CT OF THE CHEST WITHOUT CONTRAST 2/4/2024 2:38 pm TECHNIQUE: CT of the chest was performed without the administration of intravenous contrast. Multiplanar reformatted images are provided for review. Automated exposure control, iterative reconstruction, and/or weight based adjustment of the mA/kV was utilized to reduce the radiation dose to as low as reasonably achievable. COMPARISON: CT chest 09/19/2020. HISTORY: ORDERING SYSTEM PROVIDED HISTORY: hypoxia, TECHNOLOGIST PROVIDED HISTORY: Reason for exam:->hypoxia, Decision Support Exception - unselect if not a suspected or confirmed emergency medical condition->Emergency Medical Condition (MA) FINDINGS: Mediastinum: Evaluation is limited

## 2024-02-07 NOTE — PLAN OF CARE
Problem: Safety - Adult  Goal: Free from fall injury  Outcome: Progressing     Problem: Skin/Tissue Integrity  Goal: Absence of new skin breakdown  Description: 1.  Monitor for areas of redness and/or skin breakdown  2.  Assess vascular access sites hourly  3.  Every 4-6 hours minimum:  Change oxygen saturation probe site  4.  Every 4-6 hours:  If on nasal continuous positive airway pressure, respiratory therapy assess nares and determine need for appliance change or resting period.  Outcome: Progressing     Problem: Pain  Goal: Verbalizes/displays adequate comfort level or baseline comfort level  Outcome: Progressing     Problem: Chronic Conditions and Co-morbidities  Goal: Patient's chronic conditions and co-morbidity symptoms are monitored and maintained or improved  Outcome: Progressing     Problem: Discharge Planning  Goal: Discharge to home or other facility with appropriate resources  Outcome: Progressing     Problem: ABCDS Injury Assessment  Goal: Absence of physical injury  Outcome: Progressing

## 2024-02-07 NOTE — PLAN OF CARE
Problem: Safety - Adult  Goal: Free from fall injury  Outcome: Completed     Problem: Skin/Tissue Integrity  Goal: Absence of new skin breakdown  Description: 1.  Monitor for areas of redness and/or skin breakdown  2.  Assess vascular access sites hourly  3.  Every 4-6 hours minimum:  Change oxygen saturation probe site  4.  Every 4-6 hours:  If on nasal continuous positive airway pressure, respiratory therapy assess nares and determine need for appliance change or resting period.  Outcome: Completed     Problem: Pain  Goal: Verbalizes/displays adequate comfort level or baseline comfort level  Outcome: Completed     Problem: Chronic Conditions and Co-morbidities  Goal: Patient's chronic conditions and co-morbidity symptoms are monitored and maintained or improved  Outcome: Completed     Problem: Discharge Planning  Goal: Discharge to home or other facility with appropriate resources  Outcome: Completed     Problem: ABCDS Injury Assessment  Goal: Absence of physical injury  Outcome: Completed

## 2024-02-07 NOTE — CONSULTS
[facility] Nurse  Consult Note       Heidi Wilkerson  AGE: 83 y.o.   GENDER: female  : 1940  TODAY'S DATE:  2024    Subjective:     Reason for CWOCN Evaluation and Assessment: wound assessment      Heidi Wilkerson is a 83 y.o. female referred by:   [x] Physician  [] Nursing  [] Other:     Wound Identification:  Wound Type: skin tear  Contributing Factors: chronic pressure and decreased mobility edema        PAST MEDICAL HISTORY        Diagnosis Date    Anxiety     H/O chronic respiratory failure 2020    High risk medication use 2020    HTN (hypertension)     Mitral valve prolapse     SANDRA (obstructive sleep apnea)     Paroxysmal atrial fibrillation (HCC)     Peripheral neuropathy 2020    Restrictive lung disease     chronic home O2 - 2 liters NC    Scoliosis        PAST SURGICAL HISTORY    Past Surgical History:   Procedure Laterality Date    EYE SURGERY Bilateral     cataract surgery may/2022  2022    HYSTERECTOMY (CERVIX STATUS UNKNOWN)         FAMILY HISTORY    Family History   Problem Relation Age of Onset    Cancer Other        SOCIAL HISTORY    Social History     Tobacco Use    Smoking status: Never    Smokeless tobacco: Never   Vaping Use    Vaping Use: Never used   Substance Use Topics    Alcohol use: Yes     Comment: occaisonal wine    Drug use: Never       ALLERGIES    Allergies   Allergen Reactions    Nsaids     Aspirin Other (See Comments)     Takes lower dose now    Duloxetine Other (See Comments)     Chest tightness    Hydrochlorothiazide      unknown    Hydrochlorothiazide W-Amiloride      Other reaction(s): Other: See Comments  nausea, leg cramps      Iodine      unknown    Meloxicam     Tramadol      Other reaction(s): Other: See Comments  \"feels weird\"         MEDICATIONS    No current facility-administered medications on file prior to encounter.     Current Outpatient Medications on File Prior to Encounter   Medication Sig Dispense Refill

## 2024-02-10 LAB
CULTURE: NORMAL
CULTURE: NORMAL
Lab: NORMAL
Lab: NORMAL
SPECIMEN: NORMAL
SPECIMEN: NORMAL

## 2024-02-15 ENCOUNTER — HOSPITAL ENCOUNTER (OUTPATIENT)
Age: 84
Setting detail: OBSERVATION
Discharge: HOME HEALTH | End: 2024-02-18
Attending: EMERGENCY MEDICINE | Admitting: INTERNAL MEDICINE
Payer: MEDICARE

## 2024-02-15 ENCOUNTER — APPOINTMENT (OUTPATIENT)
Dept: GENERAL RADIOLOGY | Age: 84
End: 2024-02-15
Payer: MEDICARE

## 2024-02-15 DIAGNOSIS — J96.22 ACUTE ON CHRONIC RESPIRATORY FAILURE WITH HYPOXIA AND HYPERCAPNIA (HCC): Primary | ICD-10-CM

## 2024-02-15 DIAGNOSIS — J96.21 ACUTE ON CHRONIC RESPIRATORY FAILURE WITH HYPOXIA AND HYPERCAPNIA (HCC): Primary | ICD-10-CM

## 2024-02-15 LAB
ALBUMIN SERPL-MCNC: 3.9 GM/DL (ref 3.4–5)
ALP BLD-CCNC: 105 IU/L (ref 40–129)
ALT SERPL-CCNC: 16 U/L (ref 10–40)
ANION GAP SERPL CALCULATED.3IONS-SCNC: 9 MMOL/L (ref 7–16)
AST SERPL-CCNC: 24 IU/L (ref 15–37)
BASE EXCESS MIXED: 10.9 (ref 0–3)
BASE EXCESS MIXED: 13 (ref 0–3)
BASE EXCESS: ABNORMAL (ref 0–3)
BASE EXCESS: ABNORMAL (ref 0–3)
BASOPHILS ABSOLUTE: 0 K/CU MM
BASOPHILS RELATIVE PERCENT: 0.3 % (ref 0–1)
BILIRUB SERPL-MCNC: 0.2 MG/DL (ref 0–1)
BUN SERPL-MCNC: 27 MG/DL (ref 6–23)
CALCIUM SERPL-MCNC: 9 MG/DL (ref 8.3–10.6)
CHLORIDE BLD-SCNC: 89 MMOL/L (ref 99–110)
CO2 CONTENT: 46.1 MMOL/L (ref 21–32)
CO2 CONTENT: 46.3 MMOL/L (ref 21–32)
CO2: 43 MMOL/L (ref 21–32)
CREAT SERPL-MCNC: 0.9 MG/DL (ref 0.6–1.1)
DIFFERENTIAL TYPE: ABNORMAL
EOSINOPHILS ABSOLUTE: 0 K/CU MM
EOSINOPHILS RELATIVE PERCENT: 0.5 % (ref 0–3)
GFR SERPL CREATININE-BSD FRML MDRD: >60 ML/MIN/1.73M2
GLUCOSE SERPL-MCNC: 222 MG/DL (ref 70–99)
HCO3 VENOUS: 42.9 MMOL/L (ref 22–29)
HCO3 VENOUS: 43.5 MMOL/L (ref 22–29)
HCT VFR BLD CALC: 34.9 % (ref 37–47)
HEMOGLOBIN: 10.1 GM/DL (ref 12.5–16)
IMMATURE NEUTROPHIL %: 1.3 % (ref 0–0.43)
LYMPHOCYTES ABSOLUTE: 0.7 K/CU MM
LYMPHOCYTES RELATIVE PERCENT: 9.1 % (ref 24–44)
MCH RBC QN AUTO: 31.8 PG (ref 27–31)
MCHC RBC AUTO-ENTMCNC: 28.9 % (ref 32–36)
MCV RBC AUTO: 109.7 FL (ref 78–100)
MONOCYTES ABSOLUTE: 0.7 K/CU MM
MONOCYTES RELATIVE PERCENT: 9.5 % (ref 0–4)
O2 SAT, VEN: 88.5 % (ref 50–70)
O2 SAT, VEN: 98.8 % (ref 50–70)
PCO2, VEN: 106.1 MMHG (ref 41–51)
PCO2, VEN: 91.9 MMHG (ref 41–51)
PDW BLD-RTO: 13 % (ref 11.7–14.9)
PH VENOUS: 7.22 (ref 7.32–7.43)
PH VENOUS: 7.28 (ref 7.32–7.43)
PLATELET # BLD: 279 K/CU MM (ref 140–440)
PMV BLD AUTO: 8.6 FL (ref 7.5–11.1)
PO2, VEN: 149.6 MMHG (ref 28–48)
PO2, VEN: 71.9 MMHG (ref 28–48)
POTASSIUM SERPL-SCNC: 4.7 MMOL/L (ref 3.5–5.1)
RBC # BLD: 3.18 M/CU MM (ref 4.2–5.4)
SEGMENTED NEUTROPHILS ABSOLUTE COUNT: 6 K/CU MM
SEGMENTED NEUTROPHILS RELATIVE PERCENT: 79.3 % (ref 36–66)
SODIUM BLD-SCNC: 141 MMOL/L (ref 135–145)
SOURCE, BLOOD GAS: ABNORMAL
SOURCE, BLOOD GAS: ABNORMAL
TOTAL IMMATURE NEUTOROPHIL: 0.1 K/CU MM
TOTAL PROTEIN: 7 GM/DL (ref 6.4–8.2)
WBC # BLD: 7.6 K/CU MM (ref 4–10.5)

## 2024-02-15 PROCEDURE — 85025 COMPLETE CBC W/AUTO DIFF WBC: CPT

## 2024-02-15 PROCEDURE — 71045 X-RAY EXAM CHEST 1 VIEW: CPT

## 2024-02-15 PROCEDURE — 80053 COMPREHEN METABOLIC PANEL: CPT

## 2024-02-15 PROCEDURE — 84484 ASSAY OF TROPONIN QUANT: CPT

## 2024-02-15 PROCEDURE — 94660 CPAP INITIATION&MGMT: CPT

## 2024-02-15 PROCEDURE — 6370000000 HC RX 637 (ALT 250 FOR IP): Performed by: EMERGENCY MEDICINE

## 2024-02-15 PROCEDURE — 99285 EMERGENCY DEPT VISIT HI MDM: CPT

## 2024-02-15 PROCEDURE — 94640 AIRWAY INHALATION TREATMENT: CPT

## 2024-02-15 PROCEDURE — 99291 CRITICAL CARE FIRST HOUR: CPT

## 2024-02-15 RX ORDER — IPRATROPIUM BROMIDE AND ALBUTEROL SULFATE 2.5; .5 MG/3ML; MG/3ML
1 SOLUTION RESPIRATORY (INHALATION) ONCE
Status: COMPLETED | OUTPATIENT
Start: 2024-02-15 | End: 2024-02-15

## 2024-02-15 RX ORDER — SODIUM CHLORIDE 9 MG/ML
INJECTION, SOLUTION INTRAVENOUS CONTINUOUS
Status: DISCONTINUED | OUTPATIENT
Start: 2024-02-16 | End: 2024-02-16

## 2024-02-15 RX ADMIN — IPRATROPIUM BROMIDE AND ALBUTEROL SULFATE 1 DOSE: 2.5; .5 SOLUTION RESPIRATORY (INHALATION) at 23:54

## 2024-02-16 PROBLEM — J44.9 END STAGE COPD (HCC): Status: ACTIVE | Noted: 2024-02-16

## 2024-02-16 LAB
BASE EXCESS MIXED: 15.8 (ref 0–3)
BASE EXCESS MIXED: 18.3 (ref 0–3)
BASE EXCESS: ABNORMAL (ref 0–3)
BASE EXCESS: ABNORMAL (ref 0–3)
CO2 CONTENT: 49.6 MMOL/L (ref 21–32)
CO2 CONTENT: >50 MMOL/L (ref 21–32)
EKG ATRIAL RATE: 76 BPM
EKG DIAGNOSIS: NORMAL
EKG P AXIS: 44 DEGREES
EKG P-R INTERVAL: 180 MS
EKG Q-T INTERVAL: 410 MS
EKG QRS DURATION: 84 MS
EKG QTC CALCULATION (BAZETT): 461 MS
EKG R AXIS: 4 DEGREES
EKG T AXIS: 9 DEGREES
EKG VENTRICULAR RATE: 76 BPM
HCO3 ARTERIAL: 46.7 MMOL/L (ref 21–28)
HCO3 ARTERIAL: 49.7 MMOL/L (ref 21–28)
O2 SATURATION: 88.2 % (ref 94–98)
O2 SATURATION: 89.8 % (ref 94–98)
PCO2 ARTERIAL: 120.4 MMHG (ref 35–48)
PCO2 ARTERIAL: 96.7 MMHG (ref 35–48)
PH BLOOD: 7.22 (ref 7.35–7.45)
PH BLOOD: 7.29 (ref 7.35–7.45)
PO2 ARTERIAL: 66.2 MMHG (ref 83–108)
PO2 ARTERIAL: 76.3 MMHG (ref 83–108)
SOURCE, BLOOD GAS: ABNORMAL
SOURCE, BLOOD GAS: ABNORMAL
TROPONIN, HIGH SENSITIVITY: 30 NG/L (ref 0–14)
TROPONIN, HIGH SENSITIVITY: 68 NG/L (ref 0–14)

## 2024-02-16 PROCEDURE — 96361 HYDRATE IV INFUSION ADD-ON: CPT

## 2024-02-16 PROCEDURE — 93010 ELECTROCARDIOGRAM REPORT: CPT | Performed by: INTERNAL MEDICINE

## 2024-02-16 PROCEDURE — 2580000003 HC RX 258: Performed by: NURSE PRACTITIONER

## 2024-02-16 PROCEDURE — 93005 ELECTROCARDIOGRAM TRACING: CPT | Performed by: NURSE PRACTITIONER

## 2024-02-16 PROCEDURE — 6370000000 HC RX 637 (ALT 250 FOR IP): Performed by: NURSE PRACTITIONER

## 2024-02-16 PROCEDURE — 96360 HYDRATION IV INFUSION INIT: CPT

## 2024-02-16 PROCEDURE — 82803 BLOOD GASES ANY COMBINATION: CPT

## 2024-02-16 PROCEDURE — 36600 WITHDRAWAL OF ARTERIAL BLOOD: CPT

## 2024-02-16 PROCEDURE — G0378 HOSPITAL OBSERVATION PER HR: HCPCS

## 2024-02-16 PROCEDURE — 2580000003 HC RX 258: Performed by: EMERGENCY MEDICINE

## 2024-02-16 RX ORDER — LISINOPRIL 2.5 MG/1
2.5 TABLET ORAL 2 TIMES DAILY
Status: DISCONTINUED | OUTPATIENT
Start: 2024-02-16 | End: 2024-02-18 | Stop reason: HOSPADM

## 2024-02-16 RX ORDER — SODIUM CHLORIDE 0.9 % (FLUSH) 0.9 %
5-40 SYRINGE (ML) INJECTION EVERY 12 HOURS SCHEDULED
Status: DISCONTINUED | OUTPATIENT
Start: 2024-02-16 | End: 2024-02-18 | Stop reason: HOSPADM

## 2024-02-16 RX ORDER — PRAMIPEXOLE DIHYDROCHLORIDE 0.25 MG/1
0.5 TABLET ORAL NIGHTLY
Status: DISCONTINUED | OUTPATIENT
Start: 2024-02-16 | End: 2024-02-18 | Stop reason: HOSPADM

## 2024-02-16 RX ORDER — SODIUM CHLORIDE 9 MG/ML
INJECTION, SOLUTION INTRAVENOUS PRN
Status: DISCONTINUED | OUTPATIENT
Start: 2024-02-16 | End: 2024-02-18 | Stop reason: HOSPADM

## 2024-02-16 RX ORDER — SODIUM CHLORIDE 0.9 % (FLUSH) 0.9 %
5-40 SYRINGE (ML) INJECTION PRN
Status: DISCONTINUED | OUTPATIENT
Start: 2024-02-16 | End: 2024-02-18 | Stop reason: HOSPADM

## 2024-02-16 RX ORDER — HALOPERIDOL 0.5 MG/1
1 TABLET ORAL
Status: DISCONTINUED | OUTPATIENT
Start: 2024-02-16 | End: 2024-02-18 | Stop reason: HOSPADM

## 2024-02-16 RX ORDER — FERROUS SULFATE 325(65) MG
325 TABLET ORAL 2 TIMES DAILY
COMMUNITY

## 2024-02-16 RX ORDER — POLYETHYLENE GLYCOL 3350 17 G/17G
17 POWDER, FOR SOLUTION ORAL DAILY PRN
COMMUNITY

## 2024-02-16 RX ORDER — PROCHLORPERAZINE EDISYLATE 5 MG/ML
10 INJECTION INTRAMUSCULAR; INTRAVENOUS EVERY 6 HOURS PRN
Status: DISCONTINUED | OUTPATIENT
Start: 2024-02-16 | End: 2024-02-18 | Stop reason: HOSPADM

## 2024-02-16 RX ORDER — FERROUS SULFATE 325(65) MG
325 TABLET ORAL 2 TIMES DAILY WITH MEALS
Status: DISCONTINUED | OUTPATIENT
Start: 2024-02-16 | End: 2024-02-18 | Stop reason: HOSPADM

## 2024-02-16 RX ORDER — PRAMIPEXOLE DIHYDROCHLORIDE 0.12 MG/1
0.12 TABLET ORAL
Status: DISCONTINUED | OUTPATIENT
Start: 2024-02-16 | End: 2024-02-18 | Stop reason: HOSPADM

## 2024-02-16 RX ORDER — 0.9 % SODIUM CHLORIDE 0.9 %
500 INTRAVENOUS SOLUTION INTRAVENOUS ONCE
Status: COMPLETED | OUTPATIENT
Start: 2024-02-16 | End: 2024-02-16

## 2024-02-16 RX ORDER — MULTIVIT,STRESS FORMULA/ZINC
1 TABLET ORAL
Status: DISCONTINUED | OUTPATIENT
Start: 2024-02-16 | End: 2024-02-18 | Stop reason: HOSPADM

## 2024-02-16 RX ORDER — ASPIRIN 81 MG/1
81 TABLET ORAL DAILY
Status: DISCONTINUED | OUTPATIENT
Start: 2024-02-16 | End: 2024-02-18 | Stop reason: HOSPADM

## 2024-02-16 RX ORDER — SILDENAFIL CITRATE 20 MG/1
20 TABLET ORAL 3 TIMES DAILY
Status: DISCONTINUED | OUTPATIENT
Start: 2024-02-16 | End: 2024-02-18 | Stop reason: HOSPADM

## 2024-02-16 RX ORDER — PANTOPRAZOLE SODIUM 40 MG/1
40 TABLET, DELAYED RELEASE ORAL
Status: DISCONTINUED | OUTPATIENT
Start: 2024-02-16 | End: 2024-02-18 | Stop reason: HOSPADM

## 2024-02-16 RX ORDER — GLYCOPYRROLATE 0.2 MG/ML
0.2 INJECTION INTRAMUSCULAR; INTRAVENOUS EVERY 4 HOURS PRN
Status: DISCONTINUED | OUTPATIENT
Start: 2024-02-16 | End: 2024-02-18 | Stop reason: HOSPADM

## 2024-02-16 RX ORDER — SOTALOL HYDROCHLORIDE 80 MG/1
80 TABLET ORAL 2 TIMES DAILY
Status: DISCONTINUED | OUTPATIENT
Start: 2024-02-16 | End: 2024-02-18 | Stop reason: HOSPADM

## 2024-02-16 RX ADMIN — PRAMIPEXOLE DIHYDROCHLORIDE 0.12 MG: 0.12 TABLET ORAL at 16:15

## 2024-02-16 RX ADMIN — FERROUS SULFATE TAB 325 MG (65 MG ELEMENTAL FE) 325 MG: 325 (65 FE) TAB at 16:15

## 2024-02-16 RX ADMIN — Medication 1 TABLET: at 16:14

## 2024-02-16 RX ADMIN — PANTOPRAZOLE SODIUM 40 MG: 40 TABLET, DELAYED RELEASE ORAL at 12:16

## 2024-02-16 RX ADMIN — SOTALOL HYDROCHLORIDE 80 MG: 80 TABLET ORAL at 20:54

## 2024-02-16 RX ADMIN — LISINOPRIL 2.5 MG: 2.5 TABLET ORAL at 12:16

## 2024-02-16 RX ADMIN — SILDENAFIL 20 MG: 20 TABLET ORAL at 20:54

## 2024-02-16 RX ADMIN — SODIUM CHLORIDE, PRESERVATIVE FREE 10 ML: 5 INJECTION INTRAVENOUS at 20:55

## 2024-02-16 RX ADMIN — SODIUM CHLORIDE 500 ML: 9 INJECTION, SOLUTION INTRAVENOUS at 00:13

## 2024-02-16 RX ADMIN — TORSEMIDE 30 MG: 20 TABLET ORAL at 12:16

## 2024-02-16 RX ADMIN — SODIUM CHLORIDE, PRESERVATIVE FREE 10 ML: 5 INJECTION INTRAVENOUS at 12:16

## 2024-02-16 RX ADMIN — SILDENAFIL 20 MG: 20 TABLET ORAL at 12:23

## 2024-02-16 RX ADMIN — LISINOPRIL 2.5 MG: 2.5 TABLET ORAL at 20:54

## 2024-02-16 RX ADMIN — PRAMIPEXOLE DIHYDROCHLORIDE 0.5 MG: 0.25 TABLET ORAL at 20:54

## 2024-02-16 RX ADMIN — SODIUM CHLORIDE: 9 INJECTION, SOLUTION INTRAVENOUS at 00:52

## 2024-02-16 RX ADMIN — Medication 1 TABLET: at 12:16

## 2024-02-16 RX ADMIN — ASPIRIN 81 MG: 81 TABLET, COATED ORAL at 12:16

## 2024-02-16 RX ADMIN — SOTALOL HYDROCHLORIDE 80 MG: 80 TABLET ORAL at 12:16

## 2024-02-16 ASSESSMENT — PAIN SCALES - GENERAL
PAINLEVEL_OUTOF10: 0

## 2024-02-16 NOTE — CONSULTS
[facility] Nurse  Consult Note       Heidi Wilkerson  AGE: 83 y.o.   GENDER: female  : 1940  TODAY'S DATE:  2024    Subjective:     Reason for CWOCN Evaluation and Assessment: wound assessment      Heidi Wilkerson is a 83 y.o. female referred by:   [x] Physician  [] Nursing  [] Other:     Wound Identification:  Wound Type: traumatic and skin tear  Contributing Factors: chronic pressure, decreased mobility, and decreased tissue oxygenation        PAST MEDICAL HISTORY        Diagnosis Date    Acquired cyst of kidney     Anemia     Anxiety     GERD (gastroesophageal reflux disease)     H/O chronic respiratory failure 2020    Hesitancy of micturition     High risk medication use 2020    HTN (hypertension)     Hydronephrosis     Insomnia     Localized edema     Mitral valve prolapse     Neuropathy     SANDRA (obstructive sleep apnea)     Otalgia of both ears     Paroxysmal atrial fibrillation (HCC)     Peripheral neuropathy 2020    Pneumonia 2024    Pulmonary hypertension (HCC)     Restless leg syndrome     Restrictive lung disease     chronic home O2 - 2 liters NC    Scoliosis        PAST SURGICAL HISTORY    Past Surgical History:   Procedure Laterality Date    EYE SURGERY Bilateral     cataract surgery may/2022  2022    HYSTERECTOMY (CERVIX STATUS UNKNOWN)         FAMILY HISTORY    Family History   Problem Relation Age of Onset    Cancer Other        SOCIAL HISTORY    Social History     Tobacco Use    Smoking status: Never    Smokeless tobacco: Never   Vaping Use    Vaping Use: Never used   Substance Use Topics    Alcohol use: Yes     Comment: occaisonal wine    Drug use: Never       ALLERGIES    Allergies   Allergen Reactions    Nsaids     Aspirin Other (See Comments)     Takes lower dose now    Duloxetine Other (See Comments)     Chest tightness    Hydrochlorothiazide      unknown    Hydrochlorothiazide W-Amiloride      Other reaction(s): Other: See

## 2024-02-16 NOTE — H&P
normal.  C1 and C2 have a normal relationship.  No acute fracture is identified. DEGENERATIVE CHANGES: Multilevel degenerative disc disease is worst and advanced at C4-C5 and C5-C6.  There is moderate to advanced multilevel facet arthropathy.  No critical central canal narrowing is identified. SOFT TISSUES: There is no prevertebral soft tissue swelling.     1. No acute cervical spine fracture. 2. Spinal degenerative changes as described.  Mild anterolisthesis at C6-C7 and C7-T1 is likely degenerative.           EKG this visit:  Reviewed       Electronically signed by JEREMY Wells CNP on 2/16/2024 at 3:17 AM

## 2024-02-16 NOTE — CONSULTS
[facility] Consult Note    Date: 2/16/2024  Name: Heidi Wilkerson  MRN: [id number]  YOB: 1940   Patient's PCP: No primary care provider on file.  Referring Physician: JEREMY Smalls   Acute care admission date: 2/15/2024 and 2/4 to 2/7/2024 and 12/27 to 12/31/24 at [facility]     Informant: Chart reviewed, discussed with the patient's nurse. I met with the patient at the bedside, she is alert and pleasant, but is mildly confused. There are no family currently present.     CC: shortness of breath     [place]: This is a 83 y.o. female nonsmoker with hypoxic and hypercapneic respiratory failure, severe pulmonary hypertension (PASP 75 mmHg), paroxysmal atrial fibrillation not on anticoagulation due to risk, hypertension. The patient has marked kyphoscoliosis and reported severe restrictive lung disease, no PFT's available for review. The local record states COPD although she never smoked. The patient reports she has been on oxygen for 5 years (she can't tell me who prescribed the oxygen). The patient was seen by Cardiology and Pulmonary at [facility] during the December 2023 admission, and there is a note that the patient was to be referred to Dr. Ibanez at [facility] as an out patient evidently for the pulmonary hypertension.      The patient was   admitted on 2/15/2024 from [facility] with hypoxia after taking her BiPap off, with oximetry reported in the 70's. Chest X-ray  showed small bilateral pleural effusions and atelectasis as well as the scoliosis. ABG showed respiratory acidosis. high sensitivity Troponin was 30 and 68. The patient's daughter declined intubation and requested hospice evaluation. The patient improved, and is on cannula oxygen. Case management notes are reviewed about going back to [facility] with hospice at discharge. The patient does not like the BiPap. She does not want to be on

## 2024-02-16 NOTE — CARE COORDINATION
CM contacted [facility] to check on the status of the hospice referral.  This CM was notified that [facility] has not received a referral for this patient.  CM will follow.    9:41 AM  CM faxed the referral form and supplemental documentation to [facility] to initiate the referral.  CM will follow.       10:18 AM  CM spoke with the patient's daughter Ita Escalera on the telephone regarding discharge planning.  Ita advised that she has concerns regarding the care the patient was receiving while at [facility] and she has addressed those concerns with the facility.  The patient was a resident at [facility] (assisted living) prior to being placed at [facility] for skilled therapy recently.  Ita plans for the patient to return to [facility] upon discharge with hospice services.  Ita advised that her preference would be [facility].  CM advised Ita that a consult has been made to [facility] and they should be reaching out to her to schedule a meeting, Ita voiced understanding.  CM will follow.      12:50 PM  CM received a voicemail from [facility] confirming that they received the faxed referral.  They have attempted to contact the patient's daughter Ita to schedule a meeting, left a voicemail requesting a return call, and are currently awaiting Ita's return call.  CM will follow.

## 2024-02-16 NOTE — ED PROVIDER NOTES
Controlled Substance Monitoring   12/18/2020   2:32 PM Possible medication side effects, risk of tolerance/dependence & alternative treatments discussed.;No signs of potential drug abuse or diversion identified.;Assessed functional status.;Obtaining appropriate analgesic effect of treatment.       (Please note that portions of this note were completed with a voice recognition program.  Efforts were made to edit the dictations but occasionally words are mis-transcribed.)    Sania Rojas MD (electronically signed)  Attending Emergency Physician           Sania Do MD  02/16/24 0222

## 2024-02-16 NOTE — PROGRESS NOTES
V2.0    [facility] [place]      Name:  Heidi Wilkerson /Age/Sex: 1940  (83 y.o. female)   MRN & CSN:  [id number] & [id number] Encounter Date/Time: 2024 2:14 PM EST   Location:   PCP: No primary care provider on file.     Attending:Lorenzo Thornton MD       Hospital Day: 2    Assessment and Recommendations   Heidi Wilkerson is a 83 y.o. female  who presents with End stage COPD (HCC)      Plan:   Acute on chronic respiratory failure with hypoxia and hypercapnia most likely secondary to end-stage COPD.  Patient was originally on BiPAP however family is more focused on comfort care.  Patient is currently on O2 at 3 L nasal cannula, she did not want to wear BiPAP she wanted to be able to eat.  Family is in agreement.  Patient is a DNR CC, paperwork has been signed and is in the soft chart.  Awaiting hospice consult.  Hypotension, resolved blood pressure has been 145/103 to 136/70 patient was able to take her home medications.  Continue lisinopril 2.5 mg daily.  Elevation of high-sensitivity troponins, no further intervention is wanted by patient or family.  Paroxysmal atrial fibrillation, continue sotalol and aspirin  History of hypertension on lisinopril  SANDRA, is to use nightly BiPAP      We are awaiting hospice consultation.  Patient is a DNR CC.  Confirmed with family.    Diet ADULT DIET; Regular   DVT Prophylaxis [] Lovenox, []  Heparin, [] SCDs, [] Ambulation,  [] Eliquis, [] Xarelto  [] Coumadin   Code Status DNR-CC   Disposition From: Syd Shah  Expected Disposition: [facility] with hospice  Estimated Date of Discharge: TBD  Patient requires continued admission due to awaiting hospice consult   Surrogate Decision Maker/ POA Ita Escalera     Personally reviewed Lab Studies and Imaging     Discussed management of the case with Dr. Trent my supervising physician who is in agreement with the above-noted plan of care.      Case was discussed with case management during IDR's this  Yes

## 2024-02-17 LAB
ANION GAP SERPL CALCULATED.3IONS-SCNC: 8 MMOL/L (ref 7–16)
BASOPHILS ABSOLUTE: 0 K/CU MM
BASOPHILS RELATIVE PERCENT: 0.4 % (ref 0–1)
BUN SERPL-MCNC: 25 MG/DL (ref 6–23)
CALCIUM SERPL-MCNC: 8.8 MG/DL (ref 8.3–10.6)
CHLORIDE BLD-SCNC: 91 MMOL/L (ref 99–110)
CO2: 44 MMOL/L (ref 21–32)
CREAT SERPL-MCNC: 0.8 MG/DL (ref 0.6–1.1)
DIFFERENTIAL TYPE: ABNORMAL
EOSINOPHILS ABSOLUTE: 0 K/CU MM
EOSINOPHILS RELATIVE PERCENT: 0.5 % (ref 0–3)
GFR SERPL CREATININE-BSD FRML MDRD: >60 ML/MIN/1.73M2
GLUCOSE SERPL-MCNC: 92 MG/DL (ref 70–99)
HCT VFR BLD CALC: 29.9 % (ref 37–47)
HEMOGLOBIN: 9 GM/DL (ref 12.5–16)
IMMATURE NEUTROPHIL %: 0.5 % (ref 0–0.43)
LYMPHOCYTES ABSOLUTE: 0.6 K/CU MM
LYMPHOCYTES RELATIVE PERCENT: 11.5 % (ref 24–44)
MCH RBC QN AUTO: 31.9 PG (ref 27–31)
MCHC RBC AUTO-ENTMCNC: 30.1 % (ref 32–36)
MCV RBC AUTO: 106 FL (ref 78–100)
MONOCYTES ABSOLUTE: 0.5 K/CU MM
MONOCYTES RELATIVE PERCENT: 9.1 % (ref 0–4)
PDW BLD-RTO: 13.2 % (ref 11.7–14.9)
PLATELET # BLD: 216 K/CU MM (ref 140–440)
PMV BLD AUTO: 9.4 FL (ref 7.5–11.1)
POTASSIUM SERPL-SCNC: 4 MMOL/L (ref 3.5–5.1)
RBC # BLD: 2.82 M/CU MM (ref 4.2–5.4)
SEGMENTED NEUTROPHILS ABSOLUTE COUNT: 4.3 K/CU MM
SEGMENTED NEUTROPHILS RELATIVE PERCENT: 78 % (ref 36–66)
SODIUM BLD-SCNC: 143 MMOL/L (ref 135–145)
TOTAL IMMATURE NEUTOROPHIL: 0.03 K/CU MM
WBC # BLD: 5.5 K/CU MM (ref 4–10.5)

## 2024-02-17 PROCEDURE — 80048 BASIC METABOLIC PNL TOTAL CA: CPT

## 2024-02-17 PROCEDURE — 6370000000 HC RX 637 (ALT 250 FOR IP): Performed by: NURSE PRACTITIONER

## 2024-02-17 PROCEDURE — 85025 COMPLETE CBC W/AUTO DIFF WBC: CPT

## 2024-02-17 PROCEDURE — G0378 HOSPITAL OBSERVATION PER HR: HCPCS

## 2024-02-17 RX ADMIN — PRAMIPEXOLE DIHYDROCHLORIDE 0.5 MG: 0.25 TABLET ORAL at 19:46

## 2024-02-17 RX ADMIN — ASPIRIN 81 MG: 81 TABLET, COATED ORAL at 09:37

## 2024-02-17 RX ADMIN — SILDENAFIL 20 MG: 20 TABLET ORAL at 19:46

## 2024-02-17 RX ADMIN — PANTOPRAZOLE SODIUM 40 MG: 40 TABLET, DELAYED RELEASE ORAL at 05:08

## 2024-02-17 RX ADMIN — LISINOPRIL 2.5 MG: 2.5 TABLET ORAL at 19:46

## 2024-02-17 RX ADMIN — Medication 1 TABLET: at 17:06

## 2024-02-17 RX ADMIN — FERROUS SULFATE TAB 325 MG (65 MG ELEMENTAL FE) 325 MG: 325 (65 FE) TAB at 17:07

## 2024-02-17 RX ADMIN — Medication 1 TABLET: at 09:38

## 2024-02-17 RX ADMIN — SOTALOL HYDROCHLORIDE 80 MG: 80 TABLET ORAL at 19:46

## 2024-02-17 RX ADMIN — FERROUS SULFATE TAB 325 MG (65 MG ELEMENTAL FE) 325 MG: 325 (65 FE) TAB at 09:37

## 2024-02-17 RX ADMIN — PRAMIPEXOLE DIHYDROCHLORIDE 0.12 MG: 0.12 TABLET ORAL at 17:07

## 2024-02-17 RX ADMIN — SILDENAFIL 20 MG: 20 TABLET ORAL at 17:08

## 2024-02-17 RX ADMIN — TORSEMIDE 30 MG: 20 TABLET ORAL at 09:37

## 2024-02-17 RX ADMIN — HALOPERIDOL 1 MG: 0.5 TABLET ORAL at 00:42

## 2024-02-17 RX ADMIN — LISINOPRIL 2.5 MG: 2.5 TABLET ORAL at 09:38

## 2024-02-17 RX ADMIN — Medication 1 TABLET: at 11:37

## 2024-02-17 RX ADMIN — SOTALOL HYDROCHLORIDE 80 MG: 80 TABLET ORAL at 09:37

## 2024-02-17 RX ADMIN — PRAMIPEXOLE DIHYDROCHLORIDE 0.12 MG: 0.12 TABLET ORAL at 05:08

## 2024-02-17 RX ADMIN — SILDENAFIL 20 MG: 20 TABLET ORAL at 09:38

## 2024-02-17 NOTE — PLAN OF CARE
Problem: Skin/Tissue Integrity  Goal: Absence of new skin breakdown  Description: 1.  Monitor for areas of redness and/or skin breakdown  2.  Assess vascular access sites hourly  3.  Every 4-6 hours minimum:  Change oxygen saturation probe site  4.  Every 4-6 hours:  If on nasal continuous positive airway pressure, respiratory therapy assess nares and determine need for appliance change or resting period.  Outcome: Progressing     Problem: Safety - Adult  Goal: Free from fall injury  Outcome: Progressing     Problem: ABCDS Injury Assessment  Goal: Absence of physical injury  Outcome: Progressing     Problem: Chronic Conditions and Co-morbidities  Goal: Patient's chronic conditions and co-morbidity symptoms are monitored and maintained or improved  Outcome: Progressing

## 2024-02-17 NOTE — PROGRESS NOTES
Pt has pulled her IV out and has been pulling her oxygen off all night long.  She also wore her cpap/bipap for a half hour and was continually pulling it off.  Pt has been sitting up and trying to get OOB unassisted setting bed alarm off multiple times.  Pt is alert to self and is reoriented each time.  Pt was given haldol as she is increasingly getting agitated and restless and has now pulled her IV out and has been pulling her oxygen off.  Pt currently in bed, given a warm blanket with side rails up x2 with call light within reach and bed alarm on.

## 2024-02-17 NOTE — PROGRESS NOTES
V2.0    [facility] [place]      Name:  Heidi Wilkerson /Age/Sex: 1940  (83 y.o. female)   MRN & CSN:  [id number] & [id number] Encounter Date/Time: 2024 2:14 PM EST   Location:   PCP: No primary care provider on file.     Attending:Lorenzo Thornton MD       Hospital Day: 3    Assessment and Recommendations   Heidi Wilkerson is a 83 y.o. female  who presents with End stage COPD (HCC)      Plan:   Acute on chronic respiratory failure with hypoxia and hypercapnia most likely secondary to end-stage COPD.  Patient was originally on BiPAP however family is more focused on comfort care.  Patient is currently on O2 at 3 L nasal cannula, she did not want to wear BiPAP she wanted to be able to eat.  Family is in agreement.  Patient is a DNR CC, paperwork has been signed and is in the soft chart.  Patient has been seen by [facility], she will be discharging back to [facility] tomorrow with hospice.  Hypotension, resolved   Elevation of high-sensitivity troponins, no further intervention is wanted by patient or family.  Paroxysmal atrial fibrillation, continue sotalol and aspirin  History of hypertension on lisinopril  SANDRA, is to use nightly BiPAP      Patient will be discharging to [facility] tomorrow 2024 with hospice.  Patient is a DNR CC.  Confirmed with family.    Diet ADULT DIET; Regular   DVT Prophylaxis [] Lovenox, []  Heparin, [] SCDs, [] Ambulation,  [] Eliquis, [] Xarelto  [] Coumadin   Code Status DNR-CC   Disposition From: Syd Shah  Expected Disposition: [facility] with hospice  Estimated Date of Discharge: TBD  Patient requires continued admission due to awaiting hospice consult   Surrogate Decision Maker/ ARLEN Escalera     Personally reviewed Lab Studies and Imaging     Discussed management of the case with Dr. Trent my supervising physician who is in agreement with the above-noted plan of care.              Subjective:     Chief Complaint: Short of

## 2024-02-18 VITALS
HEIGHT: 62 IN | BODY MASS INDEX: 23.83 KG/M2 | SYSTOLIC BLOOD PRESSURE: 177 MMHG | HEART RATE: 64 BPM | WEIGHT: 129.5 LBS | RESPIRATION RATE: 20 BRPM | DIASTOLIC BLOOD PRESSURE: 89 MMHG | OXYGEN SATURATION: 97 % | TEMPERATURE: 98.2 F

## 2024-02-18 PROCEDURE — G0378 HOSPITAL OBSERVATION PER HR: HCPCS

## 2024-02-18 PROCEDURE — 6370000000 HC RX 637 (ALT 250 FOR IP): Performed by: NURSE PRACTITIONER

## 2024-02-18 RX ADMIN — LISINOPRIL 2.5 MG: 2.5 TABLET ORAL at 09:04

## 2024-02-18 RX ADMIN — Medication 1 TABLET: at 09:04

## 2024-02-18 RX ADMIN — TORSEMIDE 30 MG: 20 TABLET ORAL at 09:04

## 2024-02-18 RX ADMIN — FERROUS SULFATE TAB 325 MG (65 MG ELEMENTAL FE) 325 MG: 325 (65 FE) TAB at 09:05

## 2024-02-18 RX ADMIN — PANTOPRAZOLE SODIUM 40 MG: 40 TABLET, DELAYED RELEASE ORAL at 06:45

## 2024-02-18 RX ADMIN — ASPIRIN 81 MG: 81 TABLET, COATED ORAL at 09:04

## 2024-02-18 RX ADMIN — SILDENAFIL 20 MG: 20 TABLET ORAL at 09:05

## 2024-02-18 RX ADMIN — PRAMIPEXOLE DIHYDROCHLORIDE 0.12 MG: 0.12 TABLET ORAL at 06:45

## 2024-02-18 RX ADMIN — SOTALOL HYDROCHLORIDE 80 MG: 80 TABLET ORAL at 09:04

## 2024-02-18 NOTE — PROGRESS NOTES
This RN has reviewed and agrees with KARLOS Pyle's data collection and has collaborated with this LPN regarding the patient's care plan.

## 2024-02-18 NOTE — PROGRESS NOTES
V2.0  Discharge Summary    Name:  Heidi Wilkerson /Age/Sex: 1940 (83 y.o. female)   Admit Date: 2/15/2024  Discharge Date: 24    MRN & CSN:  [id number] & [id number] Encounter Date and Time 24 8:31 AM EST    Attending:  Lorenzo Thornton MD Discharging Provider: JEREMY CAMACHO - NP       Hospital Course:     Brief HPI:   Heidi Wilkerson is a 83 y.o. female who presents with increased SOB from nursing home apparently would not wear prescribed BIPAP alarms went off and nurses noted pulse ox in 70's sent by squad to ED on NRB. Titrated over to BIPAP. Given breathing tx and some fluids to increase BP. Patients is lethargic and sleeping on exam. My report is gathered from patients daughter Ita who is her medical POA. She reports chronic COPD over last several months has gotten worse. Her mother expressed her wishes as DNRCC with not intubation. Her daughter reports she was at [facility] for rehab but would like her mohter to go back to [facility] with hospice.       Patient seen and examined today, she has no complaints.  Son is in the room he just [place] from [place], patient is very happy about that.  She will be discharging today to [facility] with hospice.  She is in stable condition at this time.        Brief Problem Based Course:   Acute on chronic respiratory failure with hypoxia and hypercapnia most likely secondary to end-stage COPD.  Patient was originally on BiPAP however family is more focused on comfort care.  Patient is currently on O2 at 3 L nasal cannula, she did not want to wear BiPAP she wanted to be able to eat.  Family is in agreement.  Patient is a DNR CC, paperwork has been signed and is in the soft chart.  Hospice has seen and accepted patient, she will be discharging today to [facility] which is her residence with hospice care.    Hypotension, resolved 145/103 to 136/70.    Hypertension, continue with lisinopril.  Elevation of high-sensitivity troponins, no

## 2024-02-18 NOTE — PLAN OF CARE
Problem: Skin/Tissue Integrity  Goal: Absence of new skin breakdown  Description: 1.  Monitor for areas of redness and/or skin breakdown  2.  Assess vascular access sites hourly  3.  Every 4-6 hours minimum:  Change oxygen saturation probe site  4.  Every 4-6 hours:  If on nasal continuous positive airway pressure, respiratory therapy assess nares and determine need for appliance change or resting period.  Outcome: Progressing     Problem: Safety - Adult  Goal: Free from fall injury  Outcome: Progressing     Problem: ABCDS Injury Assessment  Goal: Absence of physical injury  Outcome: Progressing     Problem: Chronic Conditions and Co-morbidities  Goal: Patient's chronic conditions and co-morbidity symptoms are monitored and maintained or improved  Outcome: Progressing      Anesthesia Pre Eval Note    Anesthesia ROS/Med Hx        Anesthetic Complication History:  Patient does not have a history of anesthetic complications      Pulmonary Review:  History of: asthma (as a child) -   The patient is a former smoker.     Neuro/Psych Review:    Positive for headaches  Positive for psychiatric history - Depression    Cardiovascular Review:  Patient does not have a cardiovascular history       GI/HEPATIC/RENAL Review:    Positive for GERD    End/Other Review:  Patient does not have an endo/other history        Relevant Problems   No relevant active problems       Physical Exam     Airway   Mallampati: II  TM Distance: >3 FB  Neck ROM: Full  Neck: Non-tender and Able to place in sniff position  TMJ Mobility: Good    Cardiovascular  Cardiovascular exam normal  Cardio Rhythm: Regular  Cardio Rate: Normal    Head Assessment  Head assessment: Normocephalic and Atraumatic    General Assessment  General Assessment: Alert and oriented and No acute distress    Dental Exam  Dental exam normal    Pulmonary Exam  Pulmonary exam normal  Breath sounds clear to auscultation:  Yes    Abdominal Exam  Abdominal exam normal      Anesthesia Plan    ASA Status: 2    Anesthesia Type: General    Induction: Intravenous  Preferred Airway Type: ETT  Maintenance: Inhalational      Risks Discussed: Nausea, Vomiting and Dental Injury    Post-op Pain Management: Per Surgeon      Checklist  Reviewed: NPO Status, Allergies, Medications, Problem list and Past Med History    Informed Consent  The proposed anesthetic plan, including its risks and benefits, have been discussed with the Patient  - along with the risks and benefits of alternatives.  Questions were encouraged and answered and the patient and/or representative understands and agrees to proceed.     Blood Products: Not Anticipated

## 2024-02-18 NOTE — DISCHARGE SUMMARY
V2.0  Discharge Summary     Name:  Heidi Wilkerson /Age/Sex: 1940 (83 y.o. female)   Admit Date: 2/15/2024  Discharge Date: 24    MRN & CSN:  [id number] & [id number] Encounter Date and Time 24 8:31 AM EST    Attending:  Lorenzo Thornton MD Discharging Provider: JEREMY CAMACHO - NP         Hospital Course:      Brief HPI:   Heidi Wilkerson is a 83 y.o. female who presents with increased SOB from nursing home apparently would not wear prescribed BIPAP alarms went off and nurses noted pulse ox in 70's sent by squad to ED on NRB. Titrated over to BIPAP. Given breathing tx and some fluids to increase BP. Patients is lethargic and sleeping on exam. My report is gathered from patients daughter Ita who is her medical POA. She reports chronic COPD over last several months has gotten worse. Her mother expressed her wishes as DNRCC with not intubation. Her daughter reports she was at [facility] for rehab but would like her mohter to go back to [facility] with hospice.         Patient seen and examined today, she has no complaints.  Son is in the room he just arrived from [place], patient is very happy about that.  She will be discharging today to [facility] with hospice.  She is in stable condition at this time.           Brief Problem Based Course:   Acute on chronic respiratory failure with hypoxia and hypercapnia most likely secondary to end-stage COPD.  Patient was originally on BiPAP however family is more focused on comfort care.  Patient is currently on O2 at 3 L nasal cannula, she did not want to wear BiPAP she wanted to be able to eat.  Family is in agreement.  Patient is a DNR CC, paperwork has been signed and is in the soft chart.  Hospice has seen and accepted patient, she will be discharging today to [facility] which is her residence with hospice care.    Hypotension, resolved 145/103 to 136/70.    Hypertension, continue with lisinopril.  Elevation of high-sensitivity

## 2024-02-18 NOTE — PROGRESS NOTES
Report called to [facility]. Spoke with KARLOS Torres, all questions answered and informed of  time between [id number]

## 2024-09-30 NOTE — ED NOTES
Called Kashif LEE in lab to add venous blood gas to blood drawn, voiced understanding.  
Is This A New Presentation, Or A Follow-Up?: Skin Lesion
What Type Of Note Output Would You Prefer (Optional)?: Bullet Format
Has Your Skin Lesion Been Treated?: not been treated
Additional History: Patient states she has a new lesion on her left leg that she wants checked because she can’t see it very well and she’s been itching it